# Patient Record
Sex: MALE | Race: WHITE | Employment: FULL TIME | URBAN - METROPOLITAN AREA
[De-identification: names, ages, dates, MRNs, and addresses within clinical notes are randomized per-mention and may not be internally consistent; named-entity substitution may affect disease eponyms.]

---

## 2017-08-17 ENCOUNTER — OFFICE VISIT (OUTPATIENT)
Dept: FAMILY MEDICINE CLINIC | Age: 52
End: 2017-08-17

## 2017-08-17 VITALS
WEIGHT: 190.8 LBS | DIASTOLIC BLOOD PRESSURE: 120 MMHG | OXYGEN SATURATION: 96 % | HEART RATE: 90 BPM | SYSTOLIC BLOOD PRESSURE: 180 MMHG | HEIGHT: 73 IN | BODY MASS INDEX: 25.29 KG/M2

## 2017-08-17 DIAGNOSIS — I10 ESSENTIAL HYPERTENSION: Primary | ICD-10-CM

## 2017-08-17 DIAGNOSIS — Z11.4 SCREENING FOR HIV WITHOUT PRESENCE OF RISK FACTORS: ICD-10-CM

## 2017-08-17 DIAGNOSIS — Z12.5 PROSTATE CANCER SCREENING: ICD-10-CM

## 2017-08-17 PROCEDURE — 99213 OFFICE O/P EST LOW 20 MIN: CPT | Performed by: FAMILY MEDICINE

## 2017-08-17 RX ORDER — VALSARTAN AND HYDROCHLOROTHIAZIDE 160; 12.5 MG/1; MG/1
1 TABLET, FILM COATED ORAL DAILY
Qty: 30 TABLET | Refills: 3 | Status: SHIPPED | OUTPATIENT
Start: 2017-08-17 | End: 2017-10-09

## 2017-08-17 ASSESSMENT — PATIENT HEALTH QUESTIONNAIRE - PHQ9
1. LITTLE INTEREST OR PLEASURE IN DOING THINGS: 0
SUM OF ALL RESPONSES TO PHQ QUESTIONS 1-9: 0
SUM OF ALL RESPONSES TO PHQ9 QUESTIONS 1 & 2: 0
2. FEELING DOWN, DEPRESSED OR HOPELESS: 0

## 2017-08-25 DIAGNOSIS — Z11.4 SCREENING FOR HIV WITHOUT PRESENCE OF RISK FACTORS: ICD-10-CM

## 2017-08-25 DIAGNOSIS — Z12.5 PROSTATE CANCER SCREENING: ICD-10-CM

## 2017-08-25 DIAGNOSIS — I10 ESSENTIAL HYPERTENSION: ICD-10-CM

## 2017-08-25 LAB
A/G RATIO: 1.7 (ref 1.1–2.2)
ALBUMIN SERPL-MCNC: 4.6 G/DL (ref 3.4–5)
ALP BLD-CCNC: 74 U/L (ref 40–129)
ALT SERPL-CCNC: 21 U/L (ref 10–40)
ANION GAP SERPL CALCULATED.3IONS-SCNC: 16 MMOL/L (ref 3–16)
AST SERPL-CCNC: 14 U/L (ref 15–37)
BASOPHILS ABSOLUTE: 0 K/UL (ref 0–0.2)
BASOPHILS RELATIVE PERCENT: 0.5 %
BILIRUB SERPL-MCNC: <0.2 MG/DL (ref 0–1)
BUN BLDV-MCNC: 9 MG/DL (ref 7–20)
CALCIUM SERPL-MCNC: 9.8 MG/DL (ref 8.3–10.6)
CHLORIDE BLD-SCNC: 98 MMOL/L (ref 99–110)
CHOLESTEROL, TOTAL: 167 MG/DL (ref 0–199)
CO2: 26 MMOL/L (ref 21–32)
CREAT SERPL-MCNC: 1.1 MG/DL (ref 0.9–1.3)
EOSINOPHILS ABSOLUTE: 0.2 K/UL (ref 0–0.6)
EOSINOPHILS RELATIVE PERCENT: 2.7 %
GFR AFRICAN AMERICAN: >60
GFR NON-AFRICAN AMERICAN: >60
GLOBULIN: 2.7 G/DL
GLUCOSE BLD-MCNC: 83 MG/DL (ref 70–99)
HCT VFR BLD CALC: 53 % (ref 40.5–52.5)
HDLC SERPL-MCNC: 33 MG/DL (ref 40–60)
HEMOGLOBIN: 17.7 G/DL (ref 13.5–17.5)
LDL CHOLESTEROL CALCULATED: 99 MG/DL
LYMPHOCYTES ABSOLUTE: 1.7 K/UL (ref 1–5.1)
LYMPHOCYTES RELATIVE PERCENT: 22.6 %
MCH RBC QN AUTO: 33.7 PG (ref 26–34)
MCHC RBC AUTO-ENTMCNC: 33.3 G/DL (ref 31–36)
MCV RBC AUTO: 101 FL (ref 80–100)
MONOCYTES ABSOLUTE: 1.1 K/UL (ref 0–1.3)
MONOCYTES RELATIVE PERCENT: 14.9 %
NEUTROPHILS ABSOLUTE: 4.4 K/UL (ref 1.7–7.7)
NEUTROPHILS RELATIVE PERCENT: 59.3 %
PDW BLD-RTO: 13.9 % (ref 12.4–15.4)
PLATELET # BLD: 222 K/UL (ref 135–450)
PMV BLD AUTO: 10.3 FL (ref 5–10.5)
POTASSIUM SERPL-SCNC: 4 MMOL/L (ref 3.5–5.1)
PROSTATE SPECIFIC ANTIGEN: 1.33 NG/ML (ref 0–4)
RBC # BLD: 5.24 M/UL (ref 4.2–5.9)
SODIUM BLD-SCNC: 140 MMOL/L (ref 136–145)
TOTAL PROTEIN: 7.3 G/DL (ref 6.4–8.2)
TRIGL SERPL-MCNC: 174 MG/DL (ref 0–150)
TSH REFLEX: 1.68 UIU/ML (ref 0.27–4.2)
VLDLC SERPL CALC-MCNC: 35 MG/DL
WBC # BLD: 7.5 K/UL (ref 4–11)

## 2017-08-28 LAB — HIV-1 AND HIV-2 ANTIBODIES: NORMAL

## 2017-09-08 ENCOUNTER — OFFICE VISIT (OUTPATIENT)
Dept: FAMILY MEDICINE CLINIC | Age: 52
End: 2017-09-08

## 2017-09-08 VITALS
WEIGHT: 187.8 LBS | BODY MASS INDEX: 24.78 KG/M2 | DIASTOLIC BLOOD PRESSURE: 106 MMHG | SYSTOLIC BLOOD PRESSURE: 144 MMHG | HEART RATE: 90 BPM | OXYGEN SATURATION: 97 %

## 2017-09-08 DIAGNOSIS — I10 ESSENTIAL HYPERTENSION: Primary | ICD-10-CM

## 2017-09-08 DIAGNOSIS — L98.9 SKIN LESION: ICD-10-CM

## 2017-09-08 PROBLEM — E78.1 HIGH TRIGLYCERIDES: Status: ACTIVE | Noted: 2017-09-08

## 2017-09-08 PROBLEM — Z72.0 TOBACCO ABUSE: Status: ACTIVE | Noted: 2017-09-08

## 2017-09-08 PROBLEM — E78.6 LOW HDL (UNDER 40): Status: ACTIVE | Noted: 2017-09-08

## 2017-09-08 PROCEDURE — 99213 OFFICE O/P EST LOW 20 MIN: CPT | Performed by: FAMILY MEDICINE

## 2017-09-08 RX ORDER — AMLODIPINE BESYLATE 5 MG/1
5 TABLET ORAL DAILY
Qty: 30 TABLET | Refills: 3 | Status: SHIPPED | OUTPATIENT
Start: 2017-09-08 | End: 2018-01-05 | Stop reason: SDUPTHER

## 2017-10-09 ENCOUNTER — OFFICE VISIT (OUTPATIENT)
Dept: FAMILY MEDICINE CLINIC | Age: 52
End: 2017-10-09

## 2017-10-09 VITALS
SYSTOLIC BLOOD PRESSURE: 180 MMHG | DIASTOLIC BLOOD PRESSURE: 104 MMHG | OXYGEN SATURATION: 97 % | WEIGHT: 186.2 LBS | BODY MASS INDEX: 24.57 KG/M2 | HEART RATE: 123 BPM

## 2017-10-09 DIAGNOSIS — I10 ESSENTIAL HYPERTENSION: Primary | ICD-10-CM

## 2017-10-09 DIAGNOSIS — Z72.0 TOBACCO ABUSE: ICD-10-CM

## 2017-10-09 PROCEDURE — 99213 OFFICE O/P EST LOW 20 MIN: CPT | Performed by: FAMILY MEDICINE

## 2017-10-09 RX ORDER — VALSARTAN AND HYDROCHLOROTHIAZIDE 320; 12.5 MG/1; MG/1
1 TABLET, FILM COATED ORAL DAILY
Qty: 30 TABLET | Refills: 5 | Status: SHIPPED | OUTPATIENT
Start: 2017-10-09 | End: 2018-01-26 | Stop reason: SDUPTHER

## 2017-10-09 NOTE — PATIENT INSTRUCTIONS
serving is 1 slice of bread, 1 ounce of dry cereal, or ½ cup of cooked rice, pasta, or cooked cereal. Try to choose whole-grain products as much as possible. · Limit lean meat, poultry, and fish to 2 servings each day. A serving is 3 ounces, about the size of a deck of cards. · Eat 4 to 5 servings of nuts, seeds, and legumes (cooked dried beans, lentils, and split peas) each week. A serving is 1/3 cup of nuts, 2 tablespoons of seeds, or ½ cup of cooked beans or peas. · Limit fats and oils to 2 to 3 servings each day. A serving is 1 teaspoon of vegetable oil or 2 tablespoons of salad dressing. · Limit sweets and added sugars to 5 servings or less a week. A serving is 1 tablespoon jelly or jam, ½ cup sorbet, or 1 cup of lemonade. · Eat less than 2,300 milligrams (mg) of sodium a day. If you limit your sodium to 1,500 mg a day, you can lower your blood pressure even more. Tips for success  · Start small. Do not try to make dramatic changes to your diet all at once. You might feel that you are missing out on your favorite foods and then be more likely to not follow the plan. Make small changes, and stick with them. Once those changes become habit, add a few more changes. · Try some of the following:  ¨ Make it a goal to eat a fruit or vegetable at every meal and at snacks. This will make it easy to get the recommended amount of fruits and vegetables each day. ¨ Try yogurt topped with fruit and nuts for a snack or healthy dessert. ¨ Add lettuce, tomato, cucumber, and onion to sandwiches. ¨ Combine a ready-made pizza crust with low-fat mozzarella cheese and lots of vegetable toppings. Try using tomatoes, squash, spinach, broccoli, carrots, cauliflower, and onions. ¨ Have a variety of cut-up vegetables with a low-fat dip as an appetizer instead of chips and dip. ¨ Sprinkle sunflower seeds or chopped almonds over salads. Or try adding chopped walnuts or almonds to cooked vegetables.   ¨ Try some vegetarian meals using beans and peas. Add garbanzo or kidney beans to salads. Make burritos and tacos with mashed pyle beans or black beans. Where can you learn more? Go to https://chdaniloeb.Stypi. org and sign in to your [employer] account. Enter B918 in the ScanÃ¢â‚¬Â¢Jour box to learn more about \"DASH Diet: Care Instructions. \"     If you do not have an account, please click on the \"Sign Up Now\" link. Current as of: April 3, 2017  Content Version: 11.3  © 1777-8751 [employer]. Care instructions adapted under license by [facility] ([facility]). If you have questions about a medical condition or this instruction, always ask your healthcare professional. [address] any warranty or liability for your use of this information.

## 2017-11-10 ENCOUNTER — OFFICE VISIT (OUTPATIENT)
Dept: FAMILY MEDICINE CLINIC | Age: 52
End: 2017-11-10

## 2017-11-10 VITALS
SYSTOLIC BLOOD PRESSURE: 138 MMHG | BODY MASS INDEX: 25.23 KG/M2 | WEIGHT: 190.4 LBS | HEIGHT: 73 IN | HEART RATE: 104 BPM | DIASTOLIC BLOOD PRESSURE: 86 MMHG | OXYGEN SATURATION: 98 % | RESPIRATION RATE: 16 BRPM

## 2017-11-10 DIAGNOSIS — Z72.0 TOBACCO ABUSE: ICD-10-CM

## 2017-11-10 DIAGNOSIS — I10 ESSENTIAL HYPERTENSION: Primary | ICD-10-CM

## 2017-11-10 PROCEDURE — 99213 OFFICE O/P EST LOW 20 MIN: CPT | Performed by: FAMILY MEDICINE

## 2017-11-10 NOTE — PROGRESS NOTES
Subjective:   Ankru Castañeda is a 47 y.o. male with hypertension. Hypertension ROS: taking medications as instructed, no medication side effects noted, no TIA's, no chest pain at rest or on exertion, no SOB or dyspnea on exertion, no swelling of ankles or feet. Exercise:walking 3 miles per day at work  New concerns: no new issues. Home BP: not checking    Tobacco Abuse: states he is doing better and smoking approx 15 cigts per day with goal in near future of 10 cigts/day    Outpatient Prescriptions Marked as Taking for the 11/10/17 encounter (Office Visit) with Zac Lara MD   Medication Sig Dispense Refill    valsartan-hydrochlorothiazide (DIOVAN-HCT) 320-12.5 MG per tablet Take 1 tablet by mouth daily 30 tablet 5    amLODIPine (NORVASC) 5 MG tablet Take 1 tablet by mouth daily 30 tablet 3     No Known Allergies    Objective:   /86 (Site: Left Arm, Position: Sitting, Cuff Size: Medium Adult)   Pulse 104   Resp 16   Ht 6' 1\" (1.854 m)   Wt 190 lb 6.4 oz (86.4 kg)   SpO2 98%   BMI 25.12 kg/m²    BP: my cuff  144/90                         patient cuff: n/a  Appearance alert, well appearing, and in no distress. Neck: No JVD, or bruits  Lungs: Chest is clear; no wheezes or rales. Heart: S1 and S2 normal, no murmurs, clicks, gallops or rubs. Regular rate and rhythm. Ext[de-identified] No edema  Lab review: 8/2017. Assessment/Plan:    Indra Zhang was seen today for 1 month follow-up and hypertension.     Diagnoses and all orders for this visit:    Essential hypertension  Improved  Borderline/Reasonably well controlled  Continue current treatment   Home BP checks  Return 2 months   Exercise  Tobacco abuse  Smoking cessation discussed with the patient

## 2018-01-26 ENCOUNTER — OFFICE VISIT (OUTPATIENT)
Dept: FAMILY MEDICINE CLINIC | Age: 53
End: 2018-01-26

## 2018-01-26 VITALS
OXYGEN SATURATION: 95 % | SYSTOLIC BLOOD PRESSURE: 130 MMHG | HEART RATE: 106 BPM | WEIGHT: 186.8 LBS | BODY MASS INDEX: 24.65 KG/M2 | DIASTOLIC BLOOD PRESSURE: 86 MMHG

## 2018-01-26 DIAGNOSIS — I10 ESSENTIAL HYPERTENSION: ICD-10-CM

## 2018-01-26 PROCEDURE — 99213 OFFICE O/P EST LOW 20 MIN: CPT | Performed by: FAMILY MEDICINE

## 2018-01-26 RX ORDER — AMLODIPINE BESYLATE 10 MG/1
TABLET ORAL
Qty: 30 TABLET | Refills: 5 | Status: SHIPPED | OUTPATIENT
Start: 2018-01-26 | End: 2018-07-28 | Stop reason: SDUPTHER

## 2018-01-26 RX ORDER — VALSARTAN AND HYDROCHLOROTHIAZIDE 320; 12.5 MG/1; MG/1
1 TABLET, FILM COATED ORAL DAILY
Qty: 30 TABLET | Refills: 5 | Status: SHIPPED | OUTPATIENT
Start: 2018-01-26 | End: 2018-03-09

## 2018-01-26 NOTE — PATIENT INSTRUCTIONS
pressure numbers will appear on the screen. · Write your numbers in your log book, along with the date and time. Manual blood pressure monitors  · Place the earpieces of a stethoscope in your ears, and place the bell of the stethoscope over the artery, just below the cuff. · Close the valve on the rubber inflating bulb. · Squeeze the bulb rapidly with your opposite hand to inflate the cuff until the dial or column of mercury reads about 30 mm Hg higher than your usual systolic pressure. If you do not know your usual pressure, inflate the cuff to 210 mm Hg or until the pulse at your wrist disappears. · Open the pressure valve just slightly by twisting or pressing the valve on the bulb. · As you watch the pressure slowly fall, note the level on the dial at which you first start to hear a pulsing or tapping sound through the stethoscope. This is your systolic blood pressure. · Continue letting the air out slowly. The sounds will become muffled and will finally disappear. Note the pressure when the sounds completely disappear. This is your diastolic blood pressure. Let out all the remaining air. · Write your numbers in your log book, along with the date and time. What else should you know about the test?  Results for adults ages 25 and older (mm Hg):  · Normal (ideal): Systolic 978 or below. Diastolic 79 or below. · Prehypertension: Systolic 963 to 205. Diastolic 80 to 89. · Hypertension: Systolic 373 or above. Diastolic 90 or above. Follow-up care is a key part of your treatment and safety. Be sure to make and go to all appointments, and call your doctor if you are having problems. It's also a good idea to keep a list of the medicines you take. Where can you learn more? Go to https://lashanda.BookingBug. org and sign in to your [employer] account.  Enter C427 in the StyleTrek box to learn more about \"Home Blood Pressure Test: About This Test.\"     If you do not have an account, please

## 2018-03-09 ENCOUNTER — OFFICE VISIT (OUTPATIENT)
Dept: FAMILY MEDICINE CLINIC | Age: 53
End: 2018-03-09

## 2018-03-09 VITALS
WEIGHT: 187.8 LBS | HEART RATE: 95 BPM | DIASTOLIC BLOOD PRESSURE: 84 MMHG | SYSTOLIC BLOOD PRESSURE: 132 MMHG | BODY MASS INDEX: 24.78 KG/M2 | OXYGEN SATURATION: 96 %

## 2018-03-09 DIAGNOSIS — N52.9 ERECTILE DYSFUNCTION, UNSPECIFIED ERECTILE DYSFUNCTION TYPE: ICD-10-CM

## 2018-03-09 DIAGNOSIS — I10 ESSENTIAL HYPERTENSION: Primary | ICD-10-CM

## 2018-03-09 PROCEDURE — 99213 OFFICE O/P EST LOW 20 MIN: CPT | Performed by: FAMILY MEDICINE

## 2018-03-09 RX ORDER — VALSARTAN AND HYDROCHLOROTHIAZIDE 320; 25 MG/1; MG/1
1 TABLET, FILM COATED ORAL DAILY
Qty: 30 TABLET | Refills: 5 | Status: SHIPPED | OUTPATIENT
Start: 2018-03-09 | End: 2018-07-30

## 2018-03-09 RX ORDER — SILDENAFIL 100 MG/1
100 TABLET, FILM COATED ORAL PRN
Qty: 2 TABLET | Refills: 0 | COMMUNITY
Start: 2018-03-09 | End: 2020-06-29

## 2018-03-09 NOTE — PROGRESS NOTES
Subjective:   Torrey Koenig is a 47 y.o. male with hypertension. Hypertension ROS: taking medications as instructed, no medication side effects noted, no TIA's, no chest pain at rest or on exertion, no SOB or dyspnea on exertion, no swelling of ankles or feet. Exercise:working and push ups  New concerns: working a lot of hours. Hasn't had intercourse in 10 years and a few weeks ago had opportunity to have intercourse and could not get an erection  Home BP: checks at [facility] and last was 140/97    Outpatient Prescriptions Marked as Taking for the 3/9/18 encounter (Office Visit) with Liliam De Souza MD   Medication Sig Dispense Refill    valsartan-hydrochlorothiazide (DIOVAN-HCT) 320-12.5 MG per tablet Take 1 tablet by mouth daily 30 tablet 5    amLODIPine (NORVASC) 10 MG tablet TAKE ONE TABLET BY MOUTH DAILY 30 tablet 5     No Known Allergies    Objective:   /84 (Site: Left Arm, Position: Sitting, Cuff Size: Large Adult)   Pulse 95   Wt 187 lb 12.8 oz (85.2 kg)   SpO2 96%   BMI 24.78 kg/m²    BP: my cuff  136/82                         patient cuff: n/a  Appearance alert, well appearing, and in no distress. Neck: No JVD, or bruits  Lungs: Chest is clear; no wheezes or rales. Heart: S1 and S2 normal, no murmurs, clicks, gallops or rubs. Regular rate and rhythm. Ext[de-identified] No edema  Lab review: 8/2017. Assessment/Plan:    Anastasia Nye was seen today for hypertension. Diagnoses and all orders for this visit:    Essential hypertension  Not bad BP here but states during workday, BP has been increased at [facility]  -     Increase valsartan-hydrochlorothiazide (DIOVAN-HCT) 320-25 MG per tablet; Take 1 tablet by mouth daily  Return 1 month after on above med  Erectile dysfunction, unspecified erectile dysfunction type  -     Sample of sildenafil (VIAGRA) 100 MG tablet;  Take 1 tablet by mouth as needed for Erectile Dysfunction

## 2018-06-05 ENCOUNTER — OFFICE VISIT (OUTPATIENT)
Dept: FAMILY MEDICINE CLINIC | Age: 53
End: 2018-06-05

## 2018-06-05 VITALS
SYSTOLIC BLOOD PRESSURE: 132 MMHG | HEART RATE: 104 BPM | OXYGEN SATURATION: 98 % | DIASTOLIC BLOOD PRESSURE: 76 MMHG | WEIGHT: 181.2 LBS | HEIGHT: 73 IN | BODY MASS INDEX: 24.01 KG/M2

## 2018-06-05 DIAGNOSIS — E78.6 LOW HDL (UNDER 40): ICD-10-CM

## 2018-06-05 DIAGNOSIS — I10 ESSENTIAL HYPERTENSION: Primary | ICD-10-CM

## 2018-06-05 DIAGNOSIS — E78.1 HIGH TRIGLYCERIDES: ICD-10-CM

## 2018-06-05 DIAGNOSIS — Z12.5 PROSTATE CANCER SCREENING: ICD-10-CM

## 2018-06-05 DIAGNOSIS — Z72.0 TOBACCO ABUSE: ICD-10-CM

## 2018-06-05 PROCEDURE — 99213 OFFICE O/P EST LOW 20 MIN: CPT | Performed by: FAMILY MEDICINE

## 2018-07-28 DIAGNOSIS — I10 ESSENTIAL HYPERTENSION: ICD-10-CM

## 2018-07-30 RX ORDER — AMLODIPINE BESYLATE 10 MG/1
TABLET ORAL
Qty: 90 TABLET | Refills: 3 | Status: SHIPPED | OUTPATIENT
Start: 2018-07-30 | End: 2019-07-24 | Stop reason: SDUPTHER

## 2018-07-30 RX ORDER — VALSARTAN AND HYDROCHLOROTHIAZIDE 320; 12.5 MG/1; MG/1
TABLET, FILM COATED ORAL
Qty: 90 TABLET | Refills: 3 | Status: SHIPPED | OUTPATIENT
Start: 2018-07-30 | End: 2019-07-25 | Stop reason: SDUPTHER

## 2018-07-30 NOTE — TELEPHONE ENCOUNTER
Last OV: 06/05/2018  Last refill: 03/09/2018, #30, 5 refills. Last refill: 01/26/2018, #30, 5 refills.        Lab Results   Component Value Date     08/25/2017    K 4.0 08/25/2017    CL 98 (L) 08/25/2017    CO2 26 08/25/2017    BUN 9 08/25/2017    CREATININE 1.1 08/25/2017    GLUCOSE 83 08/25/2017    CALCIUM 9.8 08/25/2017    PROT 7.3 08/25/2017    LABALBU 4.6 08/25/2017    BILITOT <0.2 08/25/2017    ALKPHOS 74 08/25/2017    AST 14 (L) 08/25/2017    ALT 21 08/25/2017    LABGLOM >60 08/25/2017    GFRAA >60 08/25/2017    AGRATIO 1.7 08/25/2017    GLOB 2.7 08/25/2017

## 2018-08-01 DIAGNOSIS — Z12.5 PROSTATE CANCER SCREENING: ICD-10-CM

## 2018-08-01 DIAGNOSIS — E78.6 LOW HDL (UNDER 40): ICD-10-CM

## 2018-08-01 DIAGNOSIS — I10 ESSENTIAL HYPERTENSION: ICD-10-CM

## 2018-08-01 DIAGNOSIS — E78.1 HIGH TRIGLYCERIDES: ICD-10-CM

## 2018-08-01 LAB
A/G RATIO: 1.7 (ref 1.1–2.2)
ALBUMIN SERPL-MCNC: 4.2 G/DL (ref 3.4–5)
ALP BLD-CCNC: 75 U/L (ref 40–129)
ALT SERPL-CCNC: 19 U/L (ref 10–40)
ANION GAP SERPL CALCULATED.3IONS-SCNC: 13 MMOL/L (ref 3–16)
AST SERPL-CCNC: 23 U/L (ref 15–37)
BASOPHILS ABSOLUTE: 0 K/UL (ref 0–0.2)
BASOPHILS RELATIVE PERCENT: 0.6 %
BILIRUB SERPL-MCNC: 0.9 MG/DL (ref 0–1)
BUN BLDV-MCNC: 12 MG/DL (ref 7–20)
CALCIUM SERPL-MCNC: 9.2 MG/DL (ref 8.3–10.6)
CHLORIDE BLD-SCNC: 101 MMOL/L (ref 99–110)
CHOLESTEROL, TOTAL: 198 MG/DL (ref 0–199)
CO2: 29 MMOL/L (ref 21–32)
CREAT SERPL-MCNC: 0.8 MG/DL (ref 0.9–1.3)
EOSINOPHILS ABSOLUTE: 0.3 K/UL (ref 0–0.6)
EOSINOPHILS RELATIVE PERCENT: 4.9 %
GFR AFRICAN AMERICAN: >60
GFR NON-AFRICAN AMERICAN: >60
GLOBULIN: 2.5 G/DL
GLUCOSE BLD-MCNC: 101 MG/DL (ref 70–99)
HCT VFR BLD CALC: 44.6 % (ref 40.5–52.5)
HDLC SERPL-MCNC: 59 MG/DL (ref 40–60)
HEMOGLOBIN: 15.4 G/DL (ref 13.5–17.5)
LDL CHOLESTEROL CALCULATED: 115 MG/DL
LYMPHOCYTES ABSOLUTE: 1.3 K/UL (ref 1–5.1)
LYMPHOCYTES RELATIVE PERCENT: 22 %
MCH RBC QN AUTO: 33.4 PG (ref 26–34)
MCHC RBC AUTO-ENTMCNC: 34.4 G/DL (ref 31–36)
MCV RBC AUTO: 97.1 FL (ref 80–100)
MONOCYTES ABSOLUTE: 0.9 K/UL (ref 0–1.3)
MONOCYTES RELATIVE PERCENT: 15.5 %
NEUTROPHILS ABSOLUTE: 3.3 K/UL (ref 1.7–7.7)
NEUTROPHILS RELATIVE PERCENT: 57 %
PDW BLD-RTO: 14 % (ref 12.4–15.4)
PLATELET # BLD: 292 K/UL (ref 135–450)
PMV BLD AUTO: 9 FL (ref 5–10.5)
POTASSIUM SERPL-SCNC: 2.9 MMOL/L (ref 3.5–5.1)
PROSTATE SPECIFIC ANTIGEN: 3.75 NG/ML (ref 0–4)
RBC # BLD: 4.6 M/UL (ref 4.2–5.9)
SODIUM BLD-SCNC: 143 MMOL/L (ref 136–145)
TOTAL PROTEIN: 6.7 G/DL (ref 6.4–8.2)
TRIGL SERPL-MCNC: 120 MG/DL (ref 0–150)
TSH REFLEX: 0.97 UIU/ML (ref 0.27–4.2)
VLDLC SERPL CALC-MCNC: 24 MG/DL
WBC # BLD: 5.8 K/UL (ref 4–11)

## 2018-08-02 DIAGNOSIS — E87.6 HYPOKALEMIA: Primary | ICD-10-CM

## 2018-08-02 RX ORDER — POTASSIUM CHLORIDE 750 MG/1
10 TABLET, FILM COATED, EXTENDED RELEASE ORAL DAILY
Qty: 30 TABLET | Refills: 3 | Status: SHIPPED | OUTPATIENT
Start: 2018-08-02 | End: 2018-08-03 | Stop reason: SDUPTHER

## 2018-08-03 DIAGNOSIS — E87.6 HYPOKALEMIA: ICD-10-CM

## 2018-08-03 RX ORDER — POTASSIUM CHLORIDE 750 MG/1
10 TABLET, FILM COATED, EXTENDED RELEASE ORAL DAILY
Qty: 90 TABLET | Refills: 1 | Status: SHIPPED | OUTPATIENT
Start: 2018-08-03 | End: 2019-01-27 | Stop reason: SDUPTHER

## 2018-08-08 ENCOUNTER — TELEPHONE (OUTPATIENT)
Dept: FAMILY MEDICINE CLINIC | Age: 53
End: 2018-08-08

## 2018-08-08 DIAGNOSIS — R97.20 INCREASED PROSTATE SPECIFIC ANTIGEN (PSA) VELOCITY: Primary | ICD-10-CM

## 2019-03-11 ENCOUNTER — OFFICE VISIT (OUTPATIENT)
Dept: FAMILY MEDICINE CLINIC | Age: 54
End: 2019-03-11
Payer: COMMERCIAL

## 2019-03-11 VITALS
HEIGHT: 72 IN | HEART RATE: 114 BPM | SYSTOLIC BLOOD PRESSURE: 144 MMHG | BODY MASS INDEX: 25.3 KG/M2 | OXYGEN SATURATION: 99 % | WEIGHT: 186.8 LBS | DIASTOLIC BLOOD PRESSURE: 96 MMHG

## 2019-03-11 DIAGNOSIS — Z72.0 TOBACCO ABUSE: ICD-10-CM

## 2019-03-11 DIAGNOSIS — I10 ESSENTIAL HYPERTENSION: Primary | ICD-10-CM

## 2019-03-11 PROCEDURE — 99213 OFFICE O/P EST LOW 20 MIN: CPT | Performed by: FAMILY MEDICINE

## 2019-03-11 RX ORDER — TAMSULOSIN HYDROCHLORIDE 0.4 MG/1
0.4 CAPSULE ORAL DAILY
COMMUNITY
End: 2020-03-30 | Stop reason: SDUPTHER

## 2019-03-11 ASSESSMENT — PATIENT HEALTH QUESTIONNAIRE - PHQ9
2. FEELING DOWN, DEPRESSED OR HOPELESS: 0
SUM OF ALL RESPONSES TO PHQ9 QUESTIONS 1 & 2: 0
SUM OF ALL RESPONSES TO PHQ QUESTIONS 1-9: 0
SUM OF ALL RESPONSES TO PHQ QUESTIONS 1-9: 0
1. LITTLE INTEREST OR PLEASURE IN DOING THINGS: 0

## 2019-05-02 ENCOUNTER — OFFICE VISIT (OUTPATIENT)
Dept: FAMILY MEDICINE CLINIC | Age: 54
End: 2019-05-02
Payer: COMMERCIAL

## 2019-05-02 VITALS
SYSTOLIC BLOOD PRESSURE: 142 MMHG | WEIGHT: 189.4 LBS | HEART RATE: 107 BPM | BODY MASS INDEX: 25.69 KG/M2 | DIASTOLIC BLOOD PRESSURE: 84 MMHG | OXYGEN SATURATION: 96 %

## 2019-05-02 DIAGNOSIS — I10 ESSENTIAL HYPERTENSION: Primary | ICD-10-CM

## 2019-05-02 DIAGNOSIS — Z72.0 TOBACCO ABUSE: ICD-10-CM

## 2019-05-02 DIAGNOSIS — R73.01 IMPAIRED FASTING BLOOD SUGAR: ICD-10-CM

## 2019-05-02 DIAGNOSIS — E78.1 HIGH TRIGLYCERIDES: ICD-10-CM

## 2019-05-02 DIAGNOSIS — Z12.5 PROSTATE CANCER SCREENING: ICD-10-CM

## 2019-05-02 PROCEDURE — 99213 OFFICE O/P EST LOW 20 MIN: CPT | Performed by: FAMILY MEDICINE

## 2019-05-02 NOTE — PROGRESS NOTES
Subjective:   Deedee Santos is a 47 y.o. male with hypertension. Hypertension ROS: taking medications as instructed, no medication side effects noted, no TIA's, no chest pain at rest or on exertion, no SOB or dyspnea on exertion, no swelling of ankles or feet. 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 Father  2 weeks ago and he was down in [place] with him. States he has missed some of his medication     Tobacco abuse: continues to smoke and probably more in the last few weeks      Outpatient Medications Marked as Taking for the 19 encounter (Office Visit) with Israel Bautista MD   Medication Sig Dispense Refill    tamsulosin (FLOMAX) 0.4 MG capsule Take 0.4 mg by mouth daily      potassium chloride (KLOR-CON) 10 MEQ extended release tablet TAKE ONE TABLET BY MOUTH DAILY 90 tablet 2    valsartan-hydrochlorothiazide (DIOVAN-HCT) 320-12.5 MG per tablet TAKE ONE TABLET BY MOUTH DAILY 90 tablet 3    amLODIPine (NORVASC) 10 MG tablet TAKE ONE TABLET BY MOUTH DAILY 90 tablet 3    sildenafil (VIAGRA) 100 MG tablet Take 1 tablet by mouth as needed for Erectile Dysfunction 2 tablet 0     No Known Allergies    Objective:   BP (!) 142/84 (Site: Left Upper Arm, Position: Sitting, Cuff Size: Large Adult)   Pulse 107   Wt 189 lb 6.4 oz (85.9 kg)   SpO2 96%   BMI 25.69 kg/m²    BP: my cuff  130/88                         patient cuff: n/a  Appearance alert, well appearing, and in no distress. Neck: No JVD, or bruits  Lungs: Chest is clear; no wheezes or rales. Heart: S1 and S2 normal, no murmurs, clicks, gallops or rubs. Regular rate and rhythm. Ext[de-identified] No edema  Lab review: orders written for new lab studies as appropriate; see orders. Assessment/Plan:    Savanah Gutierres was seen today for blood pressure check.     Diagnoses and all orders for this visit:    Essential hypertension  -     CBC Auto Differential; Future  -     Comprehensive Metabolic Panel; Future  -     TSH with Reflex; Future  Reasonably well controlled  Continue current treatment   Advised regarding medication compliance    Tobacco abuse  Smoking cessation discussed with the patient again    High triglycerides  -     Lipid Panel; Future    Prostate cancer screening  -     Psa screening;  Future    Impaired fasting blood sugar  -     Hemoglobin A1C; Future

## 2019-05-02 NOTE — PATIENT INSTRUCTIONS
Patient Education        DASH Diet: Care Instructions  Your Care Instructions    The DASH diet is an eating plan that can help lower your blood pressure. DASH stands for Dietary Approaches to Stop Hypertension. Hypertension is high blood pressure. The DASH diet focuses on eating foods that are high in calcium, potassium, and magnesium. These nutrients can lower blood pressure. The foods that are highest in these nutrients are fruits, vegetables, low-fat dairy products, nuts, seeds, and legumes. But taking calcium, potassium, and magnesium supplements instead of eating foods that are high in those nutrients does not have the same effect. The DASH diet also includes whole grains, fish, and poultry. The DASH diet is one of several lifestyle changes your doctor may recommend to lower your high blood pressure. Your doctor may also want you to decrease the amount of sodium in your diet. Lowering sodium while following the DASH diet can lower blood pressure even further than just the DASH diet alone. Follow-up care is a key part of your treatment and safety. Be sure to make and go to all appointments, and call your doctor if you are having problems. It's also a good idea to know your test results and keep a list of the medicines you take. How can you care for yourself at home? Following the DASH diet  · Eat 4 to 5 servings of fruit each day. A serving is 1 medium-sized piece of fruit, ½ cup chopped or canned fruit, 1/4 cup dried fruit, or 4 ounces (½ cup) of fruit juice. Choose fruit more often than fruit juice. · Eat 4 to 5 servings of vegetables each day. A serving is 1 cup of lettuce or raw leafy vegetables, ½ cup of chopped or cooked vegetables, or 4 ounces (½ cup) of vegetable juice. Choose vegetables more often than vegetable juice. · Get 2 to 3 servings of low-fat and fat-free dairy each day. A serving is 8 ounces of milk, 1 cup of yogurt, or 1 ½ ounces of cheese. · Eat 6 to 8 servings of grains each day. A serving is 1 slice of bread, 1 ounce of dry cereal, or ½ cup of cooked rice, pasta, or cooked cereal. Try to choose whole-grain products as much as possible. · Limit lean meat, poultry, and fish to 2 servings each day. A serving is 3 ounces, about the size of a deck of cards. · Eat 4 to 5 servings of nuts, seeds, and legumes (cooked dried beans, lentils, and split peas) each week. A serving is 1/3 cup of nuts, 2 tablespoons of seeds, or ½ cup of cooked beans or peas. · Limit fats and oils to 2 to 3 servings each day. A serving is 1 teaspoon of vegetable oil or 2 tablespoons of salad dressing. · Limit sweets and added sugars to 5 servings or less a week. A serving is 1 tablespoon jelly or jam, ½ cup sorbet, or 1 cup of lemonade. · Eat less than 2,300 milligrams (mg) of sodium a day. If you limit your sodium to 1,500 mg a day, you can lower your blood pressure even more. Tips for success  · Start small. Do not try to make dramatic changes to your diet all at once. You might feel that you are missing out on your favorite foods and then be more likely to not follow the plan. Make small changes, and stick with them. Once those changes become habit, add a few more changes. · Try some of the following:  ? Make it a goal to eat a fruit or vegetable at every meal and at snacks. This will make it easy to get the recommended amount of fruits and vegetables each day. ? Try yogurt topped with fruit and nuts for a snack or healthy dessert. ? Add lettuce, tomato, cucumber, and onion to sandwiches. ? Combine a ready-made pizza crust with low-fat mozzarella cheese and lots of vegetable toppings. Try using tomatoes, squash, spinach, broccoli, carrots, cauliflower, and onions. ? Have a variety of cut-up vegetables with a low-fat dip as an appetizer instead of chips and dip. ? Sprinkle sunflower seeds or chopped almonds over salads. Or try adding chopped walnuts or almonds to cooked vegetables.   ? Try some vegetarian meals using beans and peas. Add garbanzo or kidney beans to salads. Make burritos and tacos with mashed pyle beans or black beans. Where can you learn more? Go to https://chdaniloeb.SpePharm. org and sign in to your [employer] account. Enter S204 in the Dashi Intelligence box to learn more about \"DASH Diet: Care Instructions. \"     If you do not have an account, please click on the \"Sign Up Now\" link. Current as of: July 22, 2018  Content Version: 11.9  © 5349-0855 [employer]. Care instructions adapted under license by [facility] ([facility]). If you have questions about a medical condition or this instruction, always ask your healthcare professional. [address] any warranty or liability for your use of this information.

## 2019-10-22 DIAGNOSIS — E87.6 HYPOKALEMIA: ICD-10-CM

## 2019-10-22 RX ORDER — POTASSIUM CHLORIDE 750 MG/1
TABLET, FILM COATED, EXTENDED RELEASE ORAL
Qty: 90 TABLET | Refills: 1 | Status: SHIPPED | OUTPATIENT
Start: 2019-10-22 | End: 2020-04-20

## 2020-03-28 ENCOUNTER — PATIENT MESSAGE (OUTPATIENT)
Dept: FAMILY MEDICINE CLINIC | Age: 55
End: 2020-03-28

## 2020-03-30 RX ORDER — TAMSULOSIN HYDROCHLORIDE 0.4 MG/1
0.4 CAPSULE ORAL DAILY
Qty: 90 CAPSULE | Refills: 0 | Status: SHIPPED | OUTPATIENT
Start: 2020-03-30 | End: 2020-04-09 | Stop reason: SDUPTHER

## 2020-04-01 RX ORDER — TAMSULOSIN HYDROCHLORIDE 0.4 MG/1
0.4 CAPSULE ORAL DAILY
Qty: 90 CAPSULE | Refills: 0 | OUTPATIENT
Start: 2020-04-01

## 2020-04-09 ENCOUNTER — TELEPHONE (OUTPATIENT)
Dept: FAMILY MEDICINE CLINIC | Age: 55
End: 2020-04-09

## 2020-04-09 ENCOUNTER — PATIENT MESSAGE (OUTPATIENT)
Dept: FAMILY MEDICINE CLINIC | Age: 55
End: 2020-04-09

## 2020-04-09 RX ORDER — TAMSULOSIN HYDROCHLORIDE 0.4 MG/1
0.4 CAPSULE ORAL DAILY
Qty: 90 CAPSULE | Refills: 0 | Status: SHIPPED | OUTPATIENT
Start: 2020-04-09 | End: 2020-04-10 | Stop reason: SDUPTHER

## 2020-04-10 RX ORDER — TAMSULOSIN HYDROCHLORIDE 0.4 MG/1
0.4 CAPSULE ORAL DAILY
Qty: 90 CAPSULE | Refills: 0 | Status: SHIPPED | OUTPATIENT
Start: 2020-04-10 | End: 2020-07-06

## 2020-04-20 RX ORDER — POTASSIUM CHLORIDE 750 MG/1
TABLET, FILM COATED, EXTENDED RELEASE ORAL
Qty: 90 TABLET | Refills: 0 | Status: SHIPPED | OUTPATIENT
Start: 2020-04-20 | End: 2020-07-20

## 2020-04-20 NOTE — TELEPHONE ENCOUNTER
Medication:   Requested Prescriptions     Pending Prescriptions Disp Refills    potassium chloride (KLOR-CON) 10 MEQ extended release tablet [Pharmacy Med Name: POTASSIUM CHLORIDE ER 10MEQ TABLET] 90 tablet 0     Sig: TAKE ONE TABLET BY MOUTH DAILY       Last Filled:  10/22/19 #90, 1 RF     Patient Phone Number: [phone] (home) [phone] (work)    Last appt: 5/2/19 HTN   Next appt: Visit date not found    Lab Results   Component Value Date     08/01/2018    K 2.9 (LL) 08/01/2018     08/01/2018    CO2 29 08/01/2018    BUN 12 08/01/2018    CREATININE 0.8 (L) 08/01/2018    GLUCOSE 101 (H) 08/01/2018    CALCIUM 9.2 08/01/2018    PROT 6.7 08/01/2018    LABALBU 4.2 08/01/2018    BILITOT 0.9 08/01/2018    ALKPHOS 75 08/01/2018    AST 23 08/01/2018    ALT 19 08/01/2018    LABGLOM >60 08/01/2018    GFRAA >60 08/01/2018    AGRATIO 1.7 08/01/2018    GLOB 2.5 08/01/2018

## 2020-04-29 RX ORDER — VARENICLINE TARTRATE 1 MG/1
1 TABLET, FILM COATED ORAL 2 TIMES DAILY
Qty: 60 TABLET | Refills: 0 | Status: SHIPPED | OUTPATIENT
Start: 2020-04-29 | End: 2020-06-25 | Stop reason: SDUPTHER

## 2020-04-29 NOTE — TELEPHONE ENCOUNTER
Medication:   Requested Prescriptions     Pending Prescriptions Disp Refills    varenicline (CHANTIX CONTINUING MONTH MTAT) 1 MG tablet 60 tablet 3     Sig: Take 1 tablet by mouth 2 times daily        Last Filled:  12/30/19 #60, 3 RF     Patient Phone Number: [phone] (home) [phone] (work)    Last appt: 5/2/2019 HTN   Next appt: Visit date not found

## 2020-06-25 ENCOUNTER — TELEPHONE (OUTPATIENT)
Dept: FAMILY MEDICINE CLINIC | Age: 55
End: 2020-06-25

## 2020-06-25 RX ORDER — VARENICLINE TARTRATE 1 MG/1
1 TABLET, FILM COATED ORAL 2 TIMES DAILY
Qty: 60 TABLET | Refills: 0 | Status: SHIPPED | OUTPATIENT
Start: 2020-06-25 | End: 2020-10-23 | Stop reason: SDUPTHER

## 2020-06-25 NOTE — TELEPHONE ENCOUNTER
Medication:   Requested Prescriptions     Pending Prescriptions Disp Refills    varenicline (CHANTIX CONTINUING MONTH MATT) 1 MG tablet 60 tablet 0     Sig: Take 1 tablet by mouth 2 times daily        Last Filled:  4/29/20 #60, 0 RF     Patient Phone Number: [phone] (home) [phone] (work)    Last appt: 5/2/2019 HTN   Next appt: Visit date not found - left message on machine, please schedule office visit when patient returns call

## 2020-06-29 ENCOUNTER — VIRTUAL VISIT (OUTPATIENT)
Dept: FAMILY MEDICINE CLINIC | Age: 55
End: 2020-06-29
Payer: COMMERCIAL

## 2020-06-29 PROCEDURE — 99214 OFFICE O/P EST MOD 30 MIN: CPT | Performed by: FAMILY MEDICINE

## 2020-06-29 ASSESSMENT — PATIENT HEALTH QUESTIONNAIRE - PHQ9
1. LITTLE INTEREST OR PLEASURE IN DOING THINGS: 0
SUM OF ALL RESPONSES TO PHQ QUESTIONS 1-9: 0
SUM OF ALL RESPONSES TO PHQ9 QUESTIONS 1 & 2: 0
2. FEELING DOWN, DEPRESSED OR HOPELESS: 0
SUM OF ALL RESPONSES TO PHQ QUESTIONS 1-9: 0

## 2020-06-29 ASSESSMENT — ENCOUNTER SYMPTOMS
GASTROINTESTINAL NEGATIVE: 1
RESPIRATORY NEGATIVE: 1

## 2020-07-06 RX ORDER — TAMSULOSIN HYDROCHLORIDE 0.4 MG/1
CAPSULE ORAL
Qty: 90 CAPSULE | Refills: 3 | Status: SHIPPED | OUTPATIENT
Start: 2020-07-06 | End: 2021-07-15

## 2020-07-06 NOTE — TELEPHONE ENCOUNTER
Medication:   Requested Prescriptions     Pending Prescriptions Disp Refills    tamsulosin (FLOMAX) 0.4 MG capsule [Pharmacy Med Name: TAMSULOSIN HCL 0.4 MG CAPSULE] 90 capsule 0     Sig: TAKE ONE CAPSULE BY MOUTH DAILY        Last Filled:  4/10/20 #90, 0 RF     Patient Phone Number: [phone] (home) [phone] (work)    Last appt: 6/29/20 HTN - VV   Next appt: Visit date not found    Last OARRS: No flowsheet data found.

## 2020-10-19 RX ORDER — VALSARTAN AND HYDROCHLOROTHIAZIDE 320; 12.5 MG/1; MG/1
TABLET, FILM COATED ORAL
Qty: 90 TABLET | Refills: 1 | Status: SHIPPED | OUTPATIENT
Start: 2020-10-19 | End: 2021-04-19

## 2020-10-19 RX ORDER — POTASSIUM CHLORIDE 750 MG/1
TABLET, FILM COATED, EXTENDED RELEASE ORAL
Qty: 90 TABLET | Refills: 1 | Status: SHIPPED | OUTPATIENT
Start: 2020-10-19 | End: 2021-04-19

## 2020-10-19 NOTE — TELEPHONE ENCOUNTER
Medication:   Requested Prescriptions     Pending Prescriptions Disp Refills    potassium chloride (KLOR-CON) 10 MEQ extended release tablet [Pharmacy Med Name: POTASSIUM CHLORIDE ER 10MEQ TABLET] 90 tablet 0     Sig: TAKE ONE TABLET BY MOUTH DAILY    valsartan-hydrochlorothiazide (DIOVAN-HCT) 320-12.5 MG per tablet [Pharmacy Med Name: Phani Caba 320-12.5 MG TAB] 90 tablet 0     Sig: TAKE ONE TABLET BY MOUTH DAILY       Last Filled:  7/20/20 #90, 0 RF     Patient Phone Number: [phone] (home) [phone] (work)    Last appt: 6/29/20 HTN - VV   Next appt: Visit date not found    Lab Results   Component Value Date     08/01/2018    K 2.9 (LL) 08/01/2018     08/01/2018    CO2 29 08/01/2018    BUN 12 08/01/2018    CREATININE 0.8 (L) 08/01/2018    GLUCOSE 101 (H) 08/01/2018    CALCIUM 9.2 08/01/2018    PROT 6.7 08/01/2018    LABALBU 4.2 08/01/2018    BILITOT 0.9 08/01/2018    ALKPHOS 75 08/01/2018    AST 23 08/01/2018    ALT 19 08/01/2018    LABGLOM >60 08/01/2018    GFRAA >60 08/01/2018    AGRATIO 1.7 08/01/2018    GLOB 2.5 08/01/2018

## 2020-12-07 ENCOUNTER — NURSE TRIAGE (OUTPATIENT)
Dept: OTHER | Facility: CLINIC | Age: 55
End: 2020-12-07

## 2020-12-07 ENCOUNTER — OFFICE VISIT (OUTPATIENT)
Dept: FAMILY MEDICINE CLINIC | Age: 55
End: 2020-12-07
Payer: COMMERCIAL

## 2020-12-07 VITALS
WEIGHT: 183.8 LBS | OXYGEN SATURATION: 96 % | BODY MASS INDEX: 24.93 KG/M2 | SYSTOLIC BLOOD PRESSURE: 132 MMHG | DIASTOLIC BLOOD PRESSURE: 84 MMHG | HEART RATE: 125 BPM

## 2020-12-07 PROCEDURE — 99214 OFFICE O/P EST MOD 30 MIN: CPT | Performed by: FAMILY MEDICINE

## 2020-12-07 ASSESSMENT — ENCOUNTER SYMPTOMS
GASTROINTESTINAL NEGATIVE: 1
RESPIRATORY NEGATIVE: 1

## 2020-12-07 ASSESSMENT — PATIENT HEALTH QUESTIONNAIRE - PHQ9
SUM OF ALL RESPONSES TO PHQ QUESTIONS 1-9: 0
SUM OF ALL RESPONSES TO PHQ QUESTIONS 1-9: 0
2. FEELING DOWN, DEPRESSED OR HOPELESS: 0
SUM OF ALL RESPONSES TO PHQ QUESTIONS 1-9: 0
SUM OF ALL RESPONSES TO PHQ9 QUESTIONS 1 & 2: 0
1. LITTLE INTEREST OR PLEASURE IN DOING THINGS: 0

## 2020-12-07 NOTE — PROGRESS NOTES
Subjective:      Patient ID: Virginia Ferrell is a 48 y.o. male. HPI  Patient presents with his girlfriend. Both she and he states that he had some unusual and bizarre symptoms starting approximately 1 PM yesterday. They lasted until she called the life squad approximately 8:40 PM.  He was not taken to the hospital last night. They both feel that these may be due to social deprivation due to Covid as he was quarantine for 14 days after being exposed to her who had tested positive with Covid. She states his behavior consisted of making sexual innuendos to her mother, some confusion, inviting someone to come over and play cards and then appearing in a thong, and other similar bizarre behavior. He denies any suicidal, self injury, or homicidal thoughts. He states he loves his job and thinks his life is fantastic. He does not have any specific reason as to what happened. He states he was somewhat unaware of this. Currently he states he is feeling in his usual state of health. The only recent event he can think of is that he stopped Chantix approximately 3 days ago, he usually plays fantasy football with a friend and he has not done this for a week and was feeling somewhat negative about this, and he states he usually goes to [employer] meetings 2 times per day and has not been doing this. This is also complexed by a reduced workforce where he works and layoffs of his friends. Review of Systems   Constitutional: Negative. HENT: Negative. Respiratory: Negative. Cardiovascular: Negative. Gastrointestinal: Negative. Musculoskeletal: Negative. Neurological: Negative. Psychiatric/Behavioral: Positive for confusion ( Yesterday). Negative for agitation, behavioral problems, dysphoric mood, hallucinations, self-injury, sleep disturbance and suicidal ideas. The patient is not nervous/anxious. Objective:   Physical Exam  Constitutional:       General: He is not in acute distress.   Eyes:

## 2020-12-07 NOTE — TELEPHONE ENCOUNTER
Reason for Disposition   Pharmacy calling with prescription questions and triager unable to answer question    Answer Assessment - Initial Assessment Questions  1. NAME of MEDICATION: \"What medicine are you calling about? \"      Chantix    2. QUESTION: Vanessa Mao is your question? \"      Pt's girlfriend reports pt quit taking Chantix on Friday and last evening pt was disoriented, garbled speech, and was suicidal. Pt's girlfriend called the squad and they think it was necessary to him to the hospital. Pt's symptoms lasted from at least 1300- 2100 last evening. Pt reports he is not suicidal currently and is A/O now. Pt's girlfriend reports pt attempted suicide 17 years ago and was hospitalized, also reports two other \"mental breaks\"    3. PRESCRIBING HCP: \"Who prescribed it? \" Reason: if prescribed by specialist, call should be referred to that group. Dr. Yaz Rubio    4. SYMPTOMS: \"Do you have any symptoms? \"      See above    5. SEVERITY: If symptoms are present, ask \"Are they mild, moderate or severe? \"      Were severe yesterday, now symptoms are not present     6. PREGNANCY:  \"Is there any chance that you are pregnant? \" \"When was your last menstrual period? \"      N/A    Protocols used: MEDICATION QUESTION CALL-ADULT-[place]    Patient called pre-service center [facility] with red flag complaint. Brief description of triage: Pt's girlfriend calls in stating that pt quit taking his Chantix on Saturday and was extremely disoriented and suicidal last evening. EMS eval last evening and did not feel pt required hospital care. Pt is currently A/O and denies SI. Triage indicates for patient to speak with PCP within 1 hour. Spoke with Brenda Ponce at [facility] who will facilitate appt scheduling. Care advice provided, patient verbalizes understanding; denies any other questions or concerns; instructed to call back for any new or worsening symptoms. Attention Provider:  Thank you for allowing me to participate in the care of your patient. The patient was connected to triage in response to information provided to the E[facility]. Please do not respond through this encounter as the response is not directed to a shared pool.

## 2021-01-18 ENCOUNTER — TELEPHONE (OUTPATIENT)
Dept: FAMILY MEDICINE CLINIC | Age: 56
End: 2021-01-18

## 2021-01-27 DIAGNOSIS — I10 ESSENTIAL HYPERTENSION: ICD-10-CM

## 2021-01-27 RX ORDER — AMLODIPINE BESYLATE 10 MG/1
TABLET ORAL
Qty: 90 TABLET | Refills: 0 | Status: SHIPPED | OUTPATIENT
Start: 2021-01-27 | End: 2021-04-28

## 2021-01-27 NOTE — TELEPHONE ENCOUNTER
Medication:   Requested Prescriptions     Pending Prescriptions Disp Refills    amLODIPine (NORVASC) 10 MG tablet [Pharmacy Med Name: amLODIPine BESYLATE 10 MG TAB] 30 tablet 0     Sig: TAKE ONE TABLET BY MOUTH DAILY       Last Filled:  7/16/20 #90, 1 RF     Patient Phone Number: [phone] (home)     Last appt: 12/7/20 discuss medications       Lab Results   Component Value Date     08/01/2018    K 2.9 (LL) 08/01/2018     08/01/2018    CO2 29 08/01/2018    BUN 12 08/01/2018    CREATININE 0.8 (L) 08/01/2018    GLUCOSE 101 (H) 08/01/2018    CALCIUM 9.2 08/01/2018    PROT 6.7 08/01/2018    LABALBU 4.2 08/01/2018    BILITOT 0.9 08/01/2018    ALKPHOS 75 08/01/2018    AST 23 08/01/2018    ALT 19 08/01/2018    LABGLOM >60 08/01/2018    GFRAA >60 08/01/2018    AGRATIO 1.7 08/01/2018    GLOB 2.5 08/01/2018

## 2021-04-15 ENCOUNTER — PATIENT MESSAGE (OUTPATIENT)
Dept: FAMILY MEDICINE CLINIC | Age: 56
End: 2021-04-15

## 2021-04-15 NOTE — TELEPHONE ENCOUNTER
From: Kim Penn  To:  Timm Collet, MD  Sent: 4/15/2021 4:33 PM EDT  Subject: Non-Urgent Medical Question    Here is my COVID-19 card

## 2021-04-28 DIAGNOSIS — I10 ESSENTIAL HYPERTENSION: ICD-10-CM

## 2021-04-28 RX ORDER — AMLODIPINE BESYLATE 10 MG/1
TABLET ORAL
Qty: 90 TABLET | Refills: 2 | Status: SHIPPED | OUTPATIENT
Start: 2021-04-28

## 2021-04-28 NOTE — TELEPHONE ENCOUNTER
Medication:   Requested Prescriptions     Pending Prescriptions Disp Refills    amLODIPine (NORVASC) 10 MG tablet [Pharmacy Med Name: amLODIPine BESYLATE 10 MG TAB] 30 tablet 0     Sig: TAKE ONE TABLET BY MOUTH DAILY       Last Filled:  1/27/21 #90, 0 RF     Patient Phone Number: [phone] (home)     Last appt: 12/7/20 discuss medications   Next appt: Visit date not found    Lab Results   Component Value Date     08/01/2018    K 2.9 (LL) 08/01/2018     08/01/2018    CO2 29 08/01/2018    BUN 12 08/01/2018    CREATININE 0.8 (L) 08/01/2018    GLUCOSE 101 (H) 08/01/2018    CALCIUM 9.2 08/01/2018    PROT 6.7 08/01/2018    LABALBU 4.2 08/01/2018    BILITOT 0.9 08/01/2018    ALKPHOS 75 08/01/2018    AST 23 08/01/2018    ALT 19 08/01/2018    LABGLOM >60 08/01/2018    GFRAA >60 08/01/2018    AGRATIO 1.7 08/01/2018    GLOB 2.5 08/01/2018

## 2021-04-30 RX ORDER — VARENICLINE TARTRATE 1 MG/1
1 TABLET, FILM COATED ORAL 2 TIMES DAILY
Qty: 60 TABLET | Refills: 2 | Status: SHIPPED | OUTPATIENT
Start: 2021-04-30

## 2021-07-16 DIAGNOSIS — E87.6 HYPOKALEMIA: ICD-10-CM

## 2021-07-16 DIAGNOSIS — I10 ESSENTIAL HYPERTENSION: ICD-10-CM

## 2021-07-16 RX ORDER — POTASSIUM CHLORIDE 750 MG/1
TABLET, FILM COATED, EXTENDED RELEASE ORAL
Qty: 30 TABLET | Refills: 1 | Status: SHIPPED | OUTPATIENT
Start: 2021-07-16 | End: 2021-09-13

## 2021-07-16 RX ORDER — VALSARTAN AND HYDROCHLOROTHIAZIDE 320; 12.5 MG/1; MG/1
TABLET, FILM COATED ORAL
Qty: 30 TABLET | Refills: 1 | Status: SHIPPED | OUTPATIENT
Start: 2021-07-16 | End: 2021-09-13

## 2021-07-16 NOTE — LETTER
[address]  Phone: [phone]  Fax: [phone]    Grace Mcfadden MD        July 20, 2021      Dear Marianne Best,    We are reaching out because it looks like our office has been trying to contact you. Please call our office at [phone] at your earliest convenience.       Sincerely,          Grace Mcfadden MD

## 2021-12-10 DIAGNOSIS — I10 ESSENTIAL HYPERTENSION: ICD-10-CM

## 2021-12-10 RX ORDER — TAMSULOSIN HYDROCHLORIDE 0.4 MG/1
CAPSULE ORAL
Qty: 10 CAPSULE | Refills: 0 | Status: SHIPPED | OUTPATIENT
Start: 2021-12-10 | End: 2021-12-23

## 2021-12-10 RX ORDER — VALSARTAN AND HYDROCHLOROTHIAZIDE 320; 12.5 MG/1; MG/1
TABLET, FILM COATED ORAL
Qty: 10 TABLET | Refills: 0 | Status: SHIPPED | OUTPATIENT
Start: 2021-12-10 | End: 2021-12-23

## 2021-12-10 NOTE — TELEPHONE ENCOUNTER
I sent 10 pills to the pharmacy for each one of his prescriptions carry him until he can get an appointment. I believe he has been told multiple times that he is due for an appointment. Let him know that he needs to make an appointment for any future refills.

## 2021-12-14 NOTE — TELEPHONE ENCOUNTER
Called patient and was unable to reach, left a VM to call and schedule an appointment to continue to get any more medication refills.

## 2021-12-25 DIAGNOSIS — I10 ESSENTIAL HYPERTENSION: ICD-10-CM

## 2021-12-25 NOTE — LETTER
Suha Frye MD  [address] [facility]  [address]  Dept: [phone]  Loc: [phone]          December 30, 2021     Faiza Tanner   [address] [address]      Dear Brandon Paget: We are sending this letter to inform you that our office has been trying to contact you. Please give our office a call at your earliest convenience.         Sincerely,       [facility]

## 2021-12-27 RX ORDER — TAMSULOSIN HYDROCHLORIDE 0.4 MG/1
CAPSULE ORAL
Qty: 5 CAPSULE | Refills: 0 | OUTPATIENT
Start: 2021-12-27

## 2021-12-27 RX ORDER — VALSARTAN AND HYDROCHLOROTHIAZIDE 320; 12.5 MG/1; MG/1
TABLET, FILM COATED ORAL
Qty: 5 TABLET | Refills: 0 | OUTPATIENT
Start: 2021-12-27

## 2021-12-30 NOTE — TELEPHONE ENCOUNTER
3rd attempt to contact pt.  Okay to close per note before I also left a message to the pharmacy to let pt know to call us to schedule

## 2022-01-20 DIAGNOSIS — I10 ESSENTIAL HYPERTENSION: ICD-10-CM

## 2022-01-20 RX ORDER — AMLODIPINE BESYLATE 10 MG/1
TABLET ORAL
Qty: 30 TABLET | Refills: 2 | OUTPATIENT
Start: 2022-01-20

## 2022-01-20 NOTE — TELEPHONE ENCOUNTER
Bertha Naranjo  [phone] Cathi No stated patient needs an office visit for any refills. We have tried to reach the patient on the last 7 refill request to inform patient that he needs to be seen with for future refills. Each encounter we have tried multiple times. Called pharmacy and they stated that they tried to reach the patient to relay the message as well with the last refill and was unable to get a hold of the patient.

## 2022-01-20 NOTE — TELEPHONE ENCOUNTER
Medication:   Requested Prescriptions     Pending Prescriptions Disp Refills    amLODIPine (NORVASC) 10 MG tablet [Pharmacy Med Name: amLODIPine BESYLATE 10 MG TAB] 30 tablet 2     Sig: TAKE ONE TABLET BY MOUTH DAILY       Last Filled:  4/28/21 with 2 refills    Patient Phone Number: [phone] (home)     Last appt: Visit date not found   Next appt: Visit date not found    Lab Results   Component Value Date     08/01/2018    K 2.9 (LL) 08/01/2018     08/01/2018    CO2 29 08/01/2018    BUN 12 08/01/2018    CREATININE 0.8 (L) 08/01/2018    GLUCOSE 101 (H) 08/01/2018    CALCIUM 9.2 08/01/2018    PROT 6.7 08/01/2018    LABALBU 4.2 08/01/2018    BILITOT 0.9 08/01/2018    ALKPHOS 75 08/01/2018    AST 23 08/01/2018    ALT 19 08/01/2018    LABGLOM >60 08/01/2018    GFRAA >60 08/01/2018    AGRATIO 1.7 08/01/2018    GLOB 2.5 08/01/2018

## 2023-03-01 ENCOUNTER — HOSPITAL ENCOUNTER (EMERGENCY)
Age: 58
Discharge: HOME | End: 2023-03-01
Attending: EMERGENCY MEDICINE
Payer: COMMERCIAL

## 2023-03-01 VITALS
DIASTOLIC BLOOD PRESSURE: 81 MMHG | BODY MASS INDEX: 24.36 KG/M2 | SYSTOLIC BLOOD PRESSURE: 151 MMHG | OXYGEN SATURATION: 97 % | WEIGHT: 174 LBS | TEMPERATURE: 97.9 F | HEIGHT: 71 IN | RESPIRATION RATE: 17 BRPM | HEART RATE: 94 BPM

## 2023-03-01 DIAGNOSIS — I10 UNCONTROLLED HYPERTENSION: ICD-10-CM

## 2023-03-01 DIAGNOSIS — R04.0 EPISTAXIS: Primary | ICD-10-CM

## 2023-03-01 LAB
A/G RATIO: 1.3 (ref 1.1–2.2)
ABO/RH: NORMAL
ALBUMIN SERPL-MCNC: 4.2 G/DL (ref 3.4–5)
ALP BLD-CCNC: 76 U/L (ref 40–129)
ALT SERPL-CCNC: 28 U/L (ref 10–40)
ANION GAP SERPL CALCULATED.3IONS-SCNC: 14 MMOL/L (ref 3–16)
ANTIBODY SCREEN: NORMAL
APTT: 27.9 SEC (ref 23–34.3)
AST SERPL-CCNC: 25 U/L (ref 15–37)
BASOPHILS ABSOLUTE: 0 K/UL (ref 0–0.2)
BASOPHILS RELATIVE PERCENT: 0.3 %
BILIRUB SERPL-MCNC: 1 MG/DL (ref 0–1)
BUN BLDV-MCNC: 20 MG/DL (ref 7–20)
CALCIUM SERPL-MCNC: 9 MG/DL (ref 8.3–10.6)
CHLORIDE BLD-SCNC: 103 MMOL/L (ref 99–110)
CO2: 24 MMOL/L (ref 21–32)
CREAT SERPL-MCNC: 0.8 MG/DL (ref 0.9–1.3)
EOSINOPHILS ABSOLUTE: 0 K/UL (ref 0–0.6)
EOSINOPHILS RELATIVE PERCENT: 0.2 %
GFR SERPL CREATININE-BSD FRML MDRD: >60 ML/MIN/{1.73_M2}
GLUCOSE BLD-MCNC: 130 MG/DL (ref 70–99)
HCT VFR BLD CALC: 42.6 % (ref 40.5–52.5)
HEMOGLOBIN: 14.3 G/DL (ref 13.5–17.5)
INR BLD: 1.03 (ref 0.87–1.14)
LYMPHOCYTES ABSOLUTE: 0.9 K/UL (ref 1–5.1)
LYMPHOCYTES RELATIVE PERCENT: 8.4 %
MAGNESIUM: 1.5 MG/DL (ref 1.8–2.4)
MCH RBC QN AUTO: 33.4 PG (ref 26–34)
MCHC RBC AUTO-ENTMCNC: 33.5 G/DL (ref 31–36)
MCV RBC AUTO: 99.6 FL (ref 80–100)
MONOCYTES ABSOLUTE: 1.3 K/UL (ref 0–1.3)
MONOCYTES RELATIVE PERCENT: 12 %
NEUTROPHILS ABSOLUTE: 8.7 K/UL (ref 1.7–7.7)
NEUTROPHILS RELATIVE PERCENT: 79.1 %
PDW BLD-RTO: 13.8 % (ref 12.4–15.4)
PLATELET # BLD: 230 K/UL (ref 135–450)
PMV BLD AUTO: 8.9 FL (ref 5–10.5)
POTASSIUM REFLEX MAGNESIUM: 3.5 MMOL/L (ref 3.5–5.1)
PROTHROMBIN TIME: 13.4 SEC (ref 11.7–14.5)
RBC # BLD: 4.28 M/UL (ref 4.2–5.9)
SODIUM BLD-SCNC: 141 MMOL/L (ref 136–145)
TOTAL PROTEIN: 7.4 G/DL (ref 6.4–8.2)
WBC # BLD: 11 K/UL (ref 4–11)

## 2023-03-01 PROCEDURE — 85610 PROTHROMBIN TIME: CPT

## 2023-03-01 PROCEDURE — 85025 COMPLETE CBC W/AUTO DIFF WBC: CPT

## 2023-03-01 PROCEDURE — 2580000003 HC RX 258: Performed by: EMERGENCY MEDICINE

## 2023-03-01 PROCEDURE — 6370000000 HC RX 637 (ALT 250 FOR IP): Performed by: EMERGENCY MEDICINE

## 2023-03-01 PROCEDURE — 96375 TX/PRO/DX INJ NEW DRUG ADDON: CPT

## 2023-03-01 PROCEDURE — 30903 CONTROL OF NOSEBLEED: CPT

## 2023-03-01 PROCEDURE — 36415 COLL VENOUS BLD VENIPUNCTURE: CPT

## 2023-03-01 PROCEDURE — 2500000003 HC RX 250 WO HCPCS: Performed by: EMERGENCY MEDICINE

## 2023-03-01 PROCEDURE — 96374 THER/PROPH/DIAG INJ IV PUSH: CPT

## 2023-03-01 PROCEDURE — 99284 EMERGENCY DEPT VISIT MOD MDM: CPT

## 2023-03-01 PROCEDURE — 85730 THROMBOPLASTIN TIME PARTIAL: CPT

## 2023-03-01 PROCEDURE — 86850 RBC ANTIBODY SCREEN: CPT

## 2023-03-01 PROCEDURE — 6360000002 HC RX W HCPCS: Performed by: EMERGENCY MEDICINE

## 2023-03-01 PROCEDURE — 83735 ASSAY OF MAGNESIUM: CPT

## 2023-03-01 PROCEDURE — 86901 BLOOD TYPING SEROLOGIC RH(D): CPT

## 2023-03-01 PROCEDURE — 30901 CONTROL OF NOSEBLEED: CPT

## 2023-03-01 PROCEDURE — 80053 COMPREHEN METABOLIC PANEL: CPT

## 2023-03-01 PROCEDURE — 30905 CONTROL OF NOSEBLEED: CPT

## 2023-03-01 PROCEDURE — 86900 BLOOD TYPING SEROLOGIC ABO: CPT

## 2023-03-01 RX ORDER — VALSARTAN AND HYDROCHLOROTHIAZIDE 320; 12.5 MG/1; MG/1
1 TABLET, FILM COATED ORAL DAILY
Qty: 30 TABLET | Refills: 0 | Status: SHIPPED | OUTPATIENT
Start: 2023-03-01

## 2023-03-01 RX ORDER — 0.9 % SODIUM CHLORIDE 0.9 %
1000 INTRAVENOUS SOLUTION INTRAVENOUS ONCE
Status: COMPLETED | OUTPATIENT
Start: 2023-03-01 | End: 2023-03-01

## 2023-03-01 RX ORDER — METOPROLOL TARTRATE 5 MG/5ML
5 INJECTION INTRAVENOUS ONCE
Status: COMPLETED | OUTPATIENT
Start: 2023-03-01 | End: 2023-03-01

## 2023-03-01 RX ORDER — CEPHALEXIN 250 MG/1
500 CAPSULE ORAL ONCE
Status: COMPLETED | OUTPATIENT
Start: 2023-03-01 | End: 2023-03-01

## 2023-03-01 RX ORDER — ONDANSETRON 2 MG/ML
4 INJECTION INTRAMUSCULAR; INTRAVENOUS
Status: DISCONTINUED | OUTPATIENT
Start: 2023-03-01 | End: 2023-03-01 | Stop reason: HOSPADM

## 2023-03-01 RX ORDER — CEPHALEXIN 500 MG/1
500 CAPSULE ORAL 4 TIMES DAILY
Qty: 20 CAPSULE | Refills: 0 | Status: SHIPPED | OUTPATIENT
Start: 2023-03-01 | End: 2023-03-06

## 2023-03-01 RX ORDER — LORAZEPAM 2 MG/ML
1 INJECTION INTRAMUSCULAR ONCE
Status: COMPLETED | OUTPATIENT
Start: 2023-03-01 | End: 2023-03-01

## 2023-03-01 RX ORDER — POTASSIUM CHLORIDE 750 MG/1
10 TABLET, EXTENDED RELEASE ORAL DAILY
Qty: 30 TABLET | Refills: 0 | Status: SHIPPED | OUTPATIENT
Start: 2023-03-01

## 2023-03-01 RX ORDER — OXYMETAZOLINE HYDROCHLORIDE 0.05 G/100ML
2 SPRAY NASAL ONCE
Status: COMPLETED | OUTPATIENT
Start: 2023-03-01 | End: 2023-03-01

## 2023-03-01 RX ORDER — AMLODIPINE BESYLATE 10 MG/1
10 TABLET ORAL DAILY
Qty: 30 TABLET | Refills: 0 | Status: SHIPPED | OUTPATIENT
Start: 2023-03-01

## 2023-03-01 RX ADMIN — OXYMETAZOLINE HCL 2 SPRAY: 0.05 SPRAY NASAL at 01:33

## 2023-03-01 RX ADMIN — ONDANSETRON 4 MG: 2 INJECTION INTRAMUSCULAR; INTRAVENOUS at 01:33

## 2023-03-01 RX ADMIN — SODIUM CHLORIDE 1000 ML: 9 INJECTION, SOLUTION INTRAVENOUS at 01:41

## 2023-03-01 RX ADMIN — LORAZEPAM 1 MG: 2 INJECTION, SOLUTION INTRAMUSCULAR; INTRAVENOUS at 01:41

## 2023-03-01 RX ADMIN — METOPROLOL TARTRATE 5 MG: 5 INJECTION, SOLUTION INTRAVENOUS at 01:33

## 2023-03-01 RX ADMIN — CEPHALEXIN 500 MG: 250 CAPSULE ORAL at 02:49

## 2023-03-01 ASSESSMENT — ENCOUNTER SYMPTOMS
SHORTNESS OF BREATH: 0
CHEST TIGHTNESS: 0
ABDOMINAL PAIN: 0
DIARRHEA: 0
NAUSEA: 0
VOMITING: 0

## 2023-03-01 NOTE — ED PROVIDER NOTES
I independently saw performed a substantive portion of the visit (history, physical, and MDM) on [address]. All diagnostic, treatment, and disposition decisions were made by myself in conjunction with the advanced practice provider. I have participated in the medical decision making and directed the treatment plan and disposition of the patient. For further details of Speedy Tanner's emergency department encounter, please see the advanced practice provider's documentation. Yuly Choe MD, am the primary physician provider of record. CHIEF COMPLAINT  Chief Complaint   Patient presents with    Epistaxis     Pt via EMS from home, c/o nosebleed on and off since 3 pm, couldn't get it to stop. Is not on blood thinners, pt hypertensive and tachy in triage     Briefly, Irena Correia is a 65 y.o. male  who presents to the ED complaining of nosebleeds for the past couple of days however has been fairly persistent since around 3 PM.  He arrives coughing and gagging and retching from blood going down the back of his throat that is gagging him. He says he cannot tell if it is coming from the left or right side currently. He arrives notably hypertensive and tachycardic. He is not anticoagulated and also noncompliant with his blood pressure medications. He adamantly denies any stimulant use including cocaine amphetamines or other stimulant medications or drugs. He has never had intractable nosebleed like this before. Denies any facial trauma. Denies headache chest pain or shortness of breath. He has not had syncope. FOCUSED PHYSICAL EXAMINATION  BP (!) 151/81   Pulse 94   Temp 97.9 °F (36.6 °C) (Oral)   Resp 17   Ht 5' 11\" (1.803 m)   Wt 174 lb (78.9 kg)   SpO2 97%   BMI 24.27 kg/m²    Focused physical examination notable for mild acute distress, well-appearing, well-nourished, normal speech and mentation without obvious facial droop, no obvious rash.   No obvious cranial nerve deficits on my initial exam.  Coughing and gagging from blood coming out of the posterior oropharynx. When the nasal clamp was removed, blood is coming out of both nostrils but left more than right. I do not visualize any specific vessel amenable to cautery on my initial assessment. No nasal tenderness or bruising present. No septal hematoma. Tachycardic, regular rhythm, clear to auscultation bilaterally although breathing rapidly and shallow from his coughing and gagging. His breathing is unlabored overall though. EKG performed in ED:  None        ED COURSE/MDM  Patient seen and evaluated. Old records reviewed. Labs and imaging reviewed. The patient's ED workup was notable for initial concern for epistaxis with tachycardia and hypertension. He no longer sees his previous PCP and has been out of his medications but is wanting to take them again for his hypertension. This is a possible causative agent of his epistaxis today. No trauma to warrant CT imaging although I did consider it. He also is adamant that he denies any drug use but does smoke and drink alcohol. Given his abnormal vital signs with epistaxis on initial assessment I did obtain blood work to evaluate for the possibility of anemia but his hemoglobin is stable at 14.3 and he is not hypotensive or unstable otherwise. Additionally, he has no thrombocytopenia or other coagulopathy to explain his symptoms and his kidney function is unremarkable. See HENNY note for procedure details, no significant success with Afrin alone and as such a left-sided rapid Rhino was placed and subsequent observation with hemostasis obtained and the patient is tolerating it quite well.   He will be given Keflex for antibiotic prophylaxis and referred to ENT for follow-up within several days for reassessment and packing removal.  I also made a primary care referral for him and will refill his previous prescriptions of amlodipine, Diovan and potassium in addition to the Keflex prescription. He did receive a dose of IV Lopressor here for initial BP/HR management. Is this patient to be included in the SEP-1 Core Measure? No   Exclusion criteria - the patient is NOT to be included for SEP-1 Core Measure due to: Infection is not suspected      Consults:  None    History obtained from: Patient and EMS    Pertinent social determinants of health:  medical noncompliance  / no PCP    Chronic conditions potentially affecting care:  HTN    Review of other records:  None    Reassessment:  See [facility] for details of medications given and reassessment    During the patient's ED course, the patient was given:  Medications   ondansetron (ZOFRAN) injection 4 mg (4 mg IntraVENous Given 3/1/23 0133)   cephALEXin (KEFLEX) capsule 500 mg (has no administration in time range)   metoprolol (LOPRESSOR) injection 5 mg (5 mg IntraVENous Given 3/1/23 0133)   0.9 % sodium chloride IV bolus 1,000 mL (0 mLs IntraVENous Stopped 3/1/23 0242)   oxymetazoline (AFRIN) 0.05 % nasal spray 2 spray (2 sprays Each Nostril Given 3/1/23 0133)   LORazepam (ATIVAN) injection 1 mg (1 mg IntraVENous Given 3/1/23 0141)        CLINICAL IMPRESSION  1. Epistaxis    2. Uncontrolled hypertension        Blood pressure (!) 151/81, pulse 94, temperature 97.9 °F (36.6 °C), temperature source Oral, resp. rate 17, height 5' 11\" (1.803 m), weight 174 lb (78.9 kg), SpO2 97 %. Tricia Amaro was discharged in stable condition. I have discussed the findings of today's workup with the patient and addressed the patient's questions and concerns. Important warning signs as well as new or worsening symptoms which would necessitate immediate return to the ED were discussed. The plan is to discharge from the ED at this time, and the patient is in stable condition. The patient acknowledged understanding is agreeable with this plan.     Patient was given scripts for the following medications. I counseled patient how to take these medications. New Prescriptions    AMLODIPINE (NORVASC) 10 MG TABLET    Take 1 tablet by mouth daily    CEPHALEXIN (KEFLEX) 500 MG CAPSULE    Take 1 capsule by mouth 4 times daily for 5 days    POTASSIUM CHLORIDE (KLOR-CON M) 10 MEQ EXTENDED RELEASE TABLET    Take 1 tablet by mouth daily    VALSARTAN-HYDROCHLOROTHIAZIDE (DIOVAN-HCT) 320-12.5 MG PER TABLET    Take 1 tablet by mouth daily       Follow-up with:  Viktor Clement [address], [address] [facility] [address]  [phone]    Schedule an appointment as soon as possible for a visit in 2 days  For symptom re-evaluation and packing removal    Critical care time:  None    DISCLAIMER: This chart was created using Dragon dictation software. Efforts were made by me to ensure accuracy, however some errors may be present due to limitations of this technology and occasionally words are not transcribed correctly.           Sheryl Avila MD  03/01/23 9331

## 2023-03-02 ASSESSMENT — ENCOUNTER SYMPTOMS
ABDOMINAL PAIN: 0
NAUSEA: 0
CHEST TIGHTNESS: 0
SHORTNESS OF BREATH: 0
VOMITING: 0
DIARRHEA: 0

## 2023-03-02 NOTE — ED PROVIDER NOTES
[facility] EMERGENCY DEPARTMENT  EMERGENCY DEPARTMENT ENCOUNTER        Pt Name: Vel Tanner  MRN: [id number]  Birthdate 1965  Date of evaluation: 3/1/2023  Provider: KAELYN Hoang - ANGI  PCP: Ean Jernigan MD  Note Started: 7:05 PM EST 3/1/23       I have seen and evaluated this patient with my supervising physician godwin      CHIEF COMPLAINT       Chief Complaint   Patient presents with    Epistaxis     Pt via EMS from home, c/o nosebleed on and off since 3 pm, couldn't get it to stop. Is not on blood thinners, pt hypertensive and tachy in triage       HISTORY OF PRESENT ILLNESS: 1 or more Elements     History from : Patient    Limitations to history : None    Vel Tanner is a 57 y.o. male who presents to the ER with complaint of left sided nasal bleeding.  States that his nose has been bleeding since 1830.  No injury or trauma.  No headache.  Patient reports that he no longer sees Dr. Jernigan and is out of his blood pressure medication.      Denies any headache, fever, lightheadedness, dizziness, visual disturbances.  No chest pain or pressure.  No neck or back pain.  No shortness of breath, cough, or congestion.  No abdominal pain, nausea, vomiting, diarrhea, constipation, or dysuria.  No rash.    Nursing Notes were all reviewed and agreed with or any disagreements were addressed in the HPI.    REVIEW OF SYSTEMS :      Review of Systems   Constitutional:  Negative for activity change, chills and fever.   HENT:  Positive for nosebleeds.    Respiratory:  Negative for chest tightness and shortness of breath.    Cardiovascular:  Negative for chest pain.   Gastrointestinal:  Negative for abdominal pain, diarrhea, nausea and vomiting.   Genitourinary:  Negative for dysuria.   All other systems reviewed and are negative.    Positives and Pertinent negatives as per HPI.     SURGICAL HISTORY     Past Surgical History:   Procedure Laterality Date    HERNIA REPAIR  1982  [address]       Discharge Medication List as of 3/1/2023  2:43 AM          ALLERGIES     Patient has no known allergies. FAMILYHISTORY       Family History   Problem Relation Age of Onset    Cancer Mother     Cancer Father         liver;  age 68, 2019: pneumonia    Hypertension Sister         SOCIAL HISTORY       Social History     Tobacco Use    Smoking status: Former     Types: Cigarettes     Quit date: 3/1/2016     Years since quittin.0    Smokeless tobacco: Never   Substance Use Topics    Alcohol use: No     Alcohol/week: 0.0 standard drinks     Comment: Goes to AA routinely    Drug use: No       SCREENINGS                         CIWA Assessment  BP: (!) 151/81  Heart Rate: 94           PHYSICAL EXAM  1 or more Elements     ED Triage Vitals [23 0112]   BP Temp Temp Source Heart Rate Resp SpO2 Height Weight   (!) 187/121 97.9 °F (36.6 °C) Oral (!) 123 23 97 % 5' 11\" (1.803 m) 174 lb (78.9 kg)       Physical Exam  Vitals and nursing note reviewed. Constitutional:       Appearance: He is well-developed. He is not diaphoretic. HENT:      Head: Normocephalic and atraumatic. Right Ear: Tympanic membrane and external ear normal.      Left Ear: Tympanic membrane and external ear normal.      Nose:      Left Nostril: Epistaxis present. Comments: Pouring bright red blood from left nostril- slowed with clamp  Eyes:      General:         Right eye: No discharge. Left eye: No discharge. Neck:      Vascular: No JVD. Cardiovascular:      Rate and Rhythm: Tachycardia present. Pulses: Normal pulses. Heart sounds: Normal heart sounds. Pulmonary:      Effort: Pulmonary effort is normal. No respiratory distress. Breath sounds: Normal breath sounds. Abdominal:      Palpations: Abdomen is soft. Musculoskeletal:         General: Normal range of motion. Skin:     General: Skin is warm and dry. Coloration: Skin is not pale.    Neurological: Mental Status: He is alert. Psychiatric:         Behavior: Behavior normal.           DIAGNOSTIC RESULTS   LABS:    Labs Reviewed   CBC WITH AUTO DIFFERENTIAL - Abnormal; Notable for the following components:       Result Value    Neutrophils Absolute 8.7 (*)     Lymphocytes Absolute 0.9 (*)     All other components within normal limits   COMPREHENSIVE METABOLIC PANEL W/ REFLEX TO MG FOR LOW K - Abnormal; Notable for the following components:    Glucose 130 (*)     Creatinine 0.8 (*)     All other components within normal limits   MAGNESIUM - Abnormal; Notable for the following components:    Magnesium 1.50 (*)     All other components within normal limits   PROTIME-INR   APTT   TYPE AND SCREEN       When ordered only abnormal lab results are displayed. All other labs were within normal range or not returned as of this dictation. EKG: When ordered, EKG's are interpreted by the Emergency Department Physician in the absence of a cardiologist.  Please see their note for interpretation of EKG. RADIOLOGY:   Non-plain film images such as CT, Ultrasound and MRI are read by the radiologist. Plain radiographic images are visualized and preliminarily interpreted by the ED Provider with the below findings:        Interpretation per the Radiologist below, if available at the time of this note:    No orders to display     No results found. No results found.     PROCEDURES   Unless otherwise noted below, none     Epistaxis Mgmt    Date/Time: 3/1/2023 12:30 AM  Performed by: KAELYN Carrera - CNP  Authorized by: Ygoi Salas MD     Consent:     Consent obtained:  Verbal    Consent given by:  Patient    Risks, benefits, and alternatives were discussed: yes      Risks discussed:  Bleeding, nasal injury, pain and infection    Alternatives discussed:  No treatment  Universal protocol:     Procedure explained and questions answered to patient or proxy's satisfaction: yes      Patient identity confirmed: Verbally with patient  Anesthesia:     Anesthesia method:  None  Procedure details:     Treatment site:  L posterior    Treatment method:  Nasal tampon    Treatment episode: initial    Post-procedure details:     Assessment:  Bleeding stopped    Procedure completion:  Tolerated    CRITICAL CARE TIME (.cctime)   There was a high probability of life-threatening clinical deterioration in the patient's condition requiring my urgent intervention. I personally saw the patient and independently provided 31 minutes of non-concurrent critical care out of the total shared critical care time provided, excluding separately reportable procedures. PAST MEDICAL HISTORY      has a past medical history of Essential hypertension (6/17/2016). Chronic Conditions affecting Care: hypertension    EMERGENCY DEPARTMENT COURSE and DIFFERENTIAL DIAGNOSIS/MDM:   Vitals:    Vitals:    03/01/23 0112 03/01/23 0200 03/01/23 0211 03/01/23 0233   BP: (!) 187/121 (!) (P) 178/112 (!) 178/112 (!) 151/81   Pulse: (!) 123 (!) (P) 101 (!) 101 94   Resp: 23 (P) 21 21 17   Temp: 97.9 °F (36.6 °C)      TempSrc: Oral      SpO2: 97%  95% 97%   Weight: 174 lb (78.9 kg)      Height: 5' 11\" (1.803 m)          Patient was given the following medications:  Medications   metoprolol (LOPRESSOR) injection 5 mg (5 mg IntraVENous Given 3/1/23 0133)   0.9 % sodium chloride IV bolus 1,000 mL (0 mLs IntraVENous Stopped 3/1/23 0242)   oxymetazoline (AFRIN) 0.05 % nasal spray 2 spray (2 sprays Each Nostril Given 3/1/23 0133)   LORazepam (ATIVAN) injection 1 mg (1 mg IntraVENous Given 3/1/23 0141)   cephALEXin (KEFLEX) capsule 500 mg (500 mg Oral Given 3/1/23 0249)             Is this patient to be included in the SEP-1 Core Measure due to severe sepsis or septic shock? No   Exclusion criteria - the patient is NOT to be included for SEP-1 Core Measure due to:   Infection is not suspected    CONSULTS: (Who and What was discussed)  None  Discussion with Other Profesionals : None    Social Determinants : no pcp    Records Reviewed : None    CC/HPI Summary, DDx, ED Course, and Reassessment:     Briefly, this is a 65year old male  who presents to the ER with complaint of left sided nasal bleeding. States that his nose has been bleeding since 1830. No injury or trauma. No headache. Patient reports that he no longer sees Dr. Ronny Pope and is out of his blood pressure medication. Iv was established and labs collected. Patient's blood pressure is very high upon arrival, ordered metoprolol iv to be given and iv fluids. Also iv ativan to be given. Patient reports that he does not use any illegal drugs and is trying to stop using alcohol. Afrin was instilled in bilateral nares and nasal packing placed in left nostril- see procedure note above. Bleeding did stop. Keflex ordered for home and follow up with ENT. Labs are unremarkable. Diovan and potassium in addition to potassium were written for home. Return for new or worsening symptoms. Disposition Considerations (include 1 Tests not done, Shared Decision Making, Pt Expectation of Test or Tx.): I did consider ct head    Shared decision making used throughout  Appropriate for outpatient management follow up      I am the Primary Clinician of Record. FINAL IMPRESSION      1. Epistaxis    2.  Uncontrolled hypertension          DISPOSITION/PLAN     DISPOSITION Decision To Discharge 03/01/2023 02:33:21 AM      PATIENT REFERRED TO:  [facility]) Pre-Services  [address], [address] [place] [address]/ [facility] [id number] [phone]    Schedule an appointment as soon as possible for a visit in 2 days  For symptom re-evaluation and packing removal      DISCHARGE MEDICATIONS:  Discharge Medication List as of 3/1/2023  2:43 AM        START taking these medications    Details   potassium chloride (KLOR-CON M) 10 MEQ extended release tablet Take 1 tablet by mouth daily, Disp-30 tablet, R-0Normal      cephALEXin (KEFLEX) 500 MG capsule Take 1 capsule by mouth 4 times daily for 5 days, Disp-20 capsule, R-0Normal             DISCONTINUED MEDICATIONS:  Discharge Medication List as of 3/1/2023  2:43 AM        STOP taking these medications       potassium chloride (KLOR-CON) 10 MEQ extended release tablet Comments:   Reason for Stopping:         tamsulosin (FLOMAX) 0.4 MG capsule Comments:   Reason for Stopping:         varenicline (CHANTIX CONTINUING MONTH MATT) 1 MG tablet Comments:   Reason for Stopping:                      (Please note that portions of this note were completed with a voice recognition program.  Efforts were made to edit the dictations but occasionally words are mis-transcribed.)    KAELYN Quintana CNP (electronically signed)           KAELYN Quintana CNP  03/01/23 [place] KAELYN Cabral CNP  03/02/23 [address] [place] [address] KAELYN Oconnor CNP  03/02/23 0246

## 2023-03-03 ENCOUNTER — OFFICE VISIT (OUTPATIENT)
Dept: ENT CLINIC | Age: 58
End: 2023-03-03
Payer: COMMERCIAL

## 2023-03-03 VITALS
OXYGEN SATURATION: 98 % | HEART RATE: 130 BPM | SYSTOLIC BLOOD PRESSURE: 113 MMHG | WEIGHT: 175 LBS | HEIGHT: 71 IN | BODY MASS INDEX: 24.5 KG/M2 | DIASTOLIC BLOOD PRESSURE: 74 MMHG | TEMPERATURE: 98.7 F

## 2023-03-03 DIAGNOSIS — R04.0 EPISTAXIS: Primary | ICD-10-CM

## 2023-03-03 DIAGNOSIS — J34.2 NASAL SEPTAL DEVIATION: ICD-10-CM

## 2023-03-03 PROCEDURE — 99203 OFFICE O/P NEW LOW 30 MIN: CPT | Performed by: OTOLARYNGOLOGY

## 2023-03-03 PROCEDURE — 3074F SYST BP LT 130 MM HG: CPT | Performed by: OTOLARYNGOLOGY

## 2023-03-03 PROCEDURE — 3078F DIAST BP <80 MM HG: CPT | Performed by: OTOLARYNGOLOGY

## 2023-03-03 ASSESSMENT — ENCOUNTER SYMPTOMS
SINUS PAIN: 0
RHINORRHEA: 0
SORE THROAT: 0

## 2023-03-03 NOTE — PROGRESS NOTES
[facility]             PCP:  Marlene Dickey MD      NEW PATIENT VISIT      REFERRED BY:     [facility]        CHIEF COMPLAINT  Chief Complaint   Patient presents with    Epistaxis        HISTORY OF PRESENT ILLNESS    Heather Romero is a 65 y.o. male who presented today for referred here for evaluation and treatment of Nosebleed onset 645 am this past Monday. Called 911 12:10 AM Tuesday, transport to [facility] ER. \"Put a balloon in my nose ,couple injection. Sent home with Rapid rhino. Luis M Fareed out last night. Minimal bleeding when it came out and none since. They gave something for blood pressure, is back down. No blood thinners and no nasal trauma or recent illness. No chronic nasal dyspnea. REVIEW OF SYSTEMS   Review of Systems   Constitutional:  Negative for chills and fever. HENT:  Negative for ear discharge, ear pain, hearing loss, rhinorrhea, sinus pain, sore throat and tinnitus. PAST MEDICAL HISTORY    Past Medical History:   Diagnosis Date    Essential hypertension 6/17/2016       Past Surgical History:   Procedure Laterality Date    HERNIA REPAIR  1982         EXAMINATION    Vitals:    03/03/23 1532   BP: 113/74   Site: Left Upper Arm   Position: Sitting   Cuff Size: Medium Adult   Pulse: (!) 130   Temp: 98.7 °F (37.1 °C)   TempSrc: Temporal   SpO2: 98%   Weight: 175 lb (79.4 kg)   Height: 5' 11\" (1.803 m)     General:  WDWN, NAD, alert and oriented  Face: There was no swelling or lesions detected. Voice: Normal with no hoarseness or hot potato voice. Ears: The external ears, mastoids, TMs and EACs appeared to be normal.   (+)  Nose: NSD to the left moderate. No active bleeding. No intranasal bleeding. The external nose, turbinates, secretions, and mucosa appeared to be normal.   Sinuses:  Maxillary and frontal sinuses were nontender to palpation and percussion.     Oral cavity:  Mucosa,

## 2023-03-03 NOTE — PATIENT INSTRUCTIONS
NASAL CARE FOR NOSEBLEED       Do not pull crusts out of nose, refrain from \"nose-picking. \"    You may blow your nose gently, but do not move your nose around when blowing. Over the next two weeks, do the following:     Use a 12 hour decongestant spray, such as 0.05% oxymetazoline nasal spray (eg. Afrin). This is available \"over the counter\" at your pharmacy. [place] two sprays in each nostril three times a day for three days, then two times a day for 2 days, and then stop for two days, and then repeat the cycle once. Use a saline (salt water) nasal spray/mist, (eg. Ocean nasal saline mist, AYR nasal spray). This is available \"over the counter\" at your pharmacy. [place] two sprays in each nostril every two to three hours while you are awake, for two weeks. Use a bedside humidifier, at night, while sleeping. Use polysporin ointment in each nostril at bedtime. This is available \"over the counter\" at your pharmacy. [place] the ointment onto the lateral wall of each nostril, gently, with a Q-tip applicator, then gently pinch and rub the nostrils as instructed. (Do not apply directly to the septum middle wall of the nose)    If bleeding occurs, pinch your nostrils together in the soft part of the nose and push gently toward your face, and hold for ten minutes. If bleeding persists, then repeat. If bleeding still persists, dampen a cotton ball with Afrin and insert into your nostril and repeat the previous pinch maneuver. If nose bleed remains uncontrolled, go to emergency room. For future prevention of nose bleeds, use a saline (salt water) nasal spray/mist, (eg. Ocean nasal saline mist, AYR nasal spray). This is available \"over the counter\" at your pharmacy. [place] two sprays in each nostril two to three times daily, while you are awake.

## 2023-03-05 ENCOUNTER — HOSPITAL ENCOUNTER (EMERGENCY)
Age: 58
Discharge: HOME | End: 2023-03-05
Payer: COMMERCIAL

## 2023-03-05 VITALS
HEIGHT: 71 IN | DIASTOLIC BLOOD PRESSURE: 92 MMHG | WEIGHT: 174 LBS | SYSTOLIC BLOOD PRESSURE: 142 MMHG | RESPIRATION RATE: 16 BRPM | OXYGEN SATURATION: 98 % | TEMPERATURE: 97.6 F | HEART RATE: 102 BPM | BODY MASS INDEX: 24.36 KG/M2

## 2023-03-05 DIAGNOSIS — R04.0 EPISTAXIS: Primary | ICD-10-CM

## 2023-03-05 PROCEDURE — 30903 CONTROL OF NOSEBLEED: CPT

## 2023-03-05 PROCEDURE — 99283 EMERGENCY DEPT VISIT LOW MDM: CPT

## 2023-03-05 PROCEDURE — 6370000000 HC RX 637 (ALT 250 FOR IP): Performed by: PHYSICIAN ASSISTANT

## 2023-03-05 PROCEDURE — 2500000003 HC RX 250 WO HCPCS: Performed by: PHYSICIAN ASSISTANT

## 2023-03-05 RX ORDER — TRANEXAMIC ACID 100 MG/ML
100 INJECTION, SOLUTION INTRAVENOUS ONCE
Status: COMPLETED | OUTPATIENT
Start: 2023-03-05 | End: 2023-03-05

## 2023-03-05 RX ORDER — OXYMETAZOLINE HYDROCHLORIDE 0.05 G/100ML
2 SPRAY NASAL ONCE
Status: COMPLETED | OUTPATIENT
Start: 2023-03-05 | End: 2023-03-05

## 2023-03-05 RX ORDER — ONDANSETRON 4 MG/1
4 TABLET, ORALLY DISINTEGRATING ORAL ONCE
Status: COMPLETED | OUTPATIENT
Start: 2023-03-05 | End: 2023-03-05

## 2023-03-05 RX ADMIN — ONDANSETRON 4 MG: 4 TABLET, ORALLY DISINTEGRATING ORAL at 21:21

## 2023-03-05 RX ADMIN — TRANEXAMIC ACID 100 MG: 100 INJECTION, SOLUTION INTRAVENOUS at 21:22

## 2023-03-05 RX ADMIN — OXYMETAZOLINE HCL 2 SPRAY: 0.05 SPRAY NASAL at 21:22

## 2023-03-05 ASSESSMENT — LIFESTYLE VARIABLES
HOW OFTEN DO YOU HAVE A DRINK CONTAINING ALCOHOL: NEVER
HOW MANY STANDARD DRINKS CONTAINING ALCOHOL DO YOU HAVE ON A TYPICAL DAY: PATIENT DOES NOT DRINK

## 2023-03-09 ENCOUNTER — OFFICE VISIT (OUTPATIENT)
Dept: ENT CLINIC | Age: 58
End: 2023-03-09
Payer: COMMERCIAL

## 2023-03-09 VITALS
HEART RATE: 97 BPM | DIASTOLIC BLOOD PRESSURE: 86 MMHG | SYSTOLIC BLOOD PRESSURE: 129 MMHG | HEIGHT: 71 IN | BODY MASS INDEX: 23.52 KG/M2 | TEMPERATURE: 98 F | WEIGHT: 168 LBS | OXYGEN SATURATION: 98 %

## 2023-03-09 DIAGNOSIS — R04.0 EPISTAXIS: Primary | ICD-10-CM

## 2023-03-09 DIAGNOSIS — J34.2 NASAL SEPTAL DEVIATION: ICD-10-CM

## 2023-03-09 DIAGNOSIS — R55 VASOVAGAL REACTION: ICD-10-CM

## 2023-03-09 PROCEDURE — 99215 OFFICE O/P EST HI 40 MIN: CPT | Performed by: OTOLARYNGOLOGY

## 2023-03-09 PROCEDURE — 3074F SYST BP LT 130 MM HG: CPT | Performed by: OTOLARYNGOLOGY

## 2023-03-09 PROCEDURE — 3078F DIAST BP <80 MM HG: CPT | Performed by: OTOLARYNGOLOGY

## 2023-03-09 NOTE — PATIENT INSTRUCTIONS
NASAL CARE FOR NOSEBLEED       Do not pull crusts out of nose, refrain from \"nose-picking. \"    You may blow your nose gently, but do not move your nose around when blowing. Over the next two weeks, do the following:     Use a 12 hour decongestant spray, such as 0.05% oxymetazoline nasal spray (eg. Afrin). This is available \"over the counter\" at your pharmacy. [place] two sprays in each nostril three times a day for three days, then two times a day for 2 days, and then stop for two days, and then repeat the cycle once. Use a saline (salt water) nasal spray/mist, (eg. Ocean nasal saline mist, AYR nasal spray). This is available \"over the counter\" at your pharmacy. [place] two sprays in each nostril every two to three hours while you are awake, for two weeks. Use a bedside humidifier, at night, while sleeping. Use polysporin ointment in each nostril at bedtime. This is available \"over the counter\" at your pharmacy. [place] the ointment onto the lateral wall of each nostril, gently, with a Q-tip applicator, then gently pinch and rub the nostrils as instructed. (Do not apply directly to the septum middle wall of the nose)    If bleeding occurs, pinch your nostrils together in the soft part of the nose and push gently toward your face, and hold for ten minutes. If bleeding persists, then repeat. If bleeding still persists, dampen a cotton ball with Afrin and insert into your nostril and repeat the previous pinch maneuver. If nose bleed remains uncontrolled, go to emergency room. For future prevention of nose bleeds, use a saline (salt water) nasal spray/mist, (eg. Ocean nasal saline mist, AYR nasal spray). This is available \"over the counter\" at your pharmacy. [place] two sprays in each nostril two to three times daily, while you are awake.

## 2023-03-09 NOTE — PROGRESS NOTES
[facility]               PCP:  Prosper Blutn MD      CHIEF COMPLAINT  Chief Complaint   Patient presents with    Epistaxis        HISTORY OF PRESENT ILLNESS    Feli Kruger is a 65 y.o. male who presented today for recheck and follow up of epistaxis. He had a recurrent nosebleed again on this past Sunday morning and went to the [facility] ER at 9 PM.  A Rapid Rhino epistat was inserted. He stated he has had \"a little bleeding\" since he was sent home but none today. PAST MEDICAL HISTORY    Past Medical History:   Diagnosis Date    Essential hypertension 6/17/2016       Past Surgical History:   Procedure Laterality Date    HERNIA REPAIR  1982         EXAMINATION    Vitals:    03/09/23 1339 03/09/23 1344 03/09/23 1352 03/09/23 1400   BP: 89/61 108/70 122/83 129/86   Site: Right Upper Arm Right Upper Arm Right Upper Arm Right Upper Arm   Position: Supine Supine Supine Supine   Cuff Size: Medium Adult Medium Adult Medium Adult    Pulse: 74  99 97   Temp:       TempSrc:       SpO2: 93% 91% 98% 98%   Weight:       Height:         General:  WDWN, NAD, alert and oriented  Face: There was no swelling or lesions detected. Voice:  Normal with no hoarseness or hot potato voice. Nose:  Rapid Rhino was present in the left nasal cavity with no evidence of active bleeding. Nasal septum was deviated to the left. There were no intranasal lesions detected. The external nose, turbinates, secretions, and mucosa appeared to be normal.   Oral cavity:  Mucosa, secretions, tongue, and gingiva appeared to be normal.   Oropharynx: Normal with no evidence of active bleeding on the posterior oropharyngeal wall. No lesions detected. The rapid Rhino Epistat was deflated with no bleeding and then removed again with no bleeding. Several minutes later the patient had a vasovagal reaction with body shaking and loss of consciousness.   He was immediately sat back in the chair as he had been leaning forward in the chair. He did not fall from the chair. The chair was laid back into a semilying position. Patient stated that he felt better. However after approximate 1 minute he had a second vasovagal reaction again with body shaking and seizure-like activity and loss of consciousness. This lasted about 1 minute and he was then speaking and stating that he felt weird. Help was called and staff came to assist after that at this point. Vital signs were taken and were followed throughout the rest of the visit. Blood pressure was initially low with a normal heart rate and low pulse oximetry in the 86-91 range. After a few minutes pulse oximetry was registering in the 77-80 range but the patient's hands were cold and it was felt that this was due to vasoconstriction and fingers. Patient was then covered with his coat and his hands were covered and subsequent pulse oximetry was 96%. Blood pressure recovered to normal range as well. At this point patient stated he felt better, good, and back to normal and felt he was ready to leave and go home. Mary Mohamud / Carmen Loera / Clarisa Edmondson was seen today for epistaxis. Diagnoses and all orders for this visit:    Epistaxis    Nasal septal deviation    Vasovagal reaction         RECOMMENDATIONS/PLAN      Patient was advised to repeat all of the instructions for care of nosebleed given at the last visit   Patient was advised to keep his appointment on 4/5/2023 that was scheduled after the last visit. Return in 27 days (on 4/5/2023) for recheck/follow-up, and sooner if condition worsens. Patient Instructions   NASAL CARE FOR NOSEBLEED       Do not pull crusts out of nose, refrain from \"nose-picking. \"    You may blow your nose gently, but do not move your nose around when blowing.       Over the next two weeks, do the following:     Use a 12 hour decongestant spray, such as 0.05% oxymetazoline nasal spray (eg. Afrin). This is available \"over the counter\" at your pharmacy. [place] two sprays in each nostril three times a day for three days, then two times a day for 2 days, and then stop for two days, and then repeat the cycle once. Use a saline (salt water) nasal spray/mist, (eg. Ocean nasal saline mist, AYR nasal spray). This is available \"over the counter\" at your pharmacy. [place] two sprays in each nostril every two to three hours while you are awake, for two weeks. Use a bedside humidifier, at night, while sleeping. Use polysporin ointment in each nostril at bedtime. This is available \"over the counter\" at your pharmacy. [place] the ointment onto the lateral wall of each nostril, gently, with a Q-tip applicator, then gently pinch and rub the nostrils as instructed. (Do not apply directly to the septum middle wall of the nose)    If bleeding occurs, pinch your nostrils together in the soft part of the nose and push gently toward your face, and hold for ten minutes. If bleeding persists, then repeat. If bleeding still persists, dampen a cotton ball with Afrin and insert into your nostril and repeat the previous pinch maneuver. If nose bleed remains uncontrolled, go to emergency room. For future prevention of nose bleeds, use a saline (salt water) nasal spray/mist, (eg. Ocean nasal saline mist, AYR nasal spray). This is available \"over the counter\" at your pharmacy. [place] two sprays in each nostril two to three times daily, while you are awake.

## 2023-03-11 NOTE — ED PROVIDER NOTES
[facility] [facility]        Pt Name: Abbie Looney  MRN: [id number]  [place] 1965  Date of evaluation: 3/5/2023  Provider: NEREIDA Carias  PCP: Tena Begum MD  Note Started: 3:56 PM EST       HENNY. I have evaluated this patient. My supervising physician was available for consultation. CHIEF COMPLAINT       Chief Complaint   Patient presents with    Epistaxis     Pt states that his nose has been bleeding for since today at 1430 on and off. Pt states that he saw ENT Friday. Pt states not on blood thinners. HISTORY OF PRESENT ILLNESS      Chief Complaint: Epistaxis    Abbie Looney is a 65 y.o. male who presents with epistaxis. Started this afternoon. Seen in the emergency department for same thing last week, had Rhino Rocket placed. Was going to follow-up with ENT, but he accidentally ripped the Rhino Rocket out of his nose, and then did not feel he needed to follow-up with ENT. He denies fevers, chills. No nausea or vomiting. No headache or sore throat. SCREENINGS    César Coma Scale  Eye Opening: Spontaneous  Best Verbal Response: Oriented  Best Motor Response: Obeys commands  César Coma Scale Score: 15      Is this patient to be included in the SEP-1 Core Measure due to severe sepsis or septic shock? No   Exclusion criteria - the patient is NOT to be included for SEP-1 Core Measure due to: Infection is not suspected      PHYSICAL EXAM     Vitals: BP (!) 142/92   Pulse (!) 102   Temp 97.6 °F (36.4 °C)   Resp 16   Ht 5' 11\" (1.803 m)   Wt 174 lb (78.9 kg)   SpO2 98%   BMI 24.27 kg/m²    General: awake, alert, no apparent distress  Pupils: equal, reactive  Head: Left nostril bleeding. Heart: Rate as noted, regular rhythm, no murmur or rubs.   Chest/Lungs: CTAB, no wheezes or crackles  Abdomen: soft, nondistended, no tenderness to palpation   Extremities:  cap refill <2 UE/LE, no tenderness of calves, no edema  Neuro: no facial droop, no slurred speech, answers questions appropriately. Skin: Warm. No visible rash, lesions, or bruising       DIAGNOSTIC RESULTS   LABS:    Labs Reviewed - No data to display    EKG: When ordered, EKG's are interpreted by the Emergency Department Physician in the absence of a cardiologist.  Please see their note for interpretation of EKG. RADIOLOGY:   No orders to display     No results found. No results found. PROCEDURES   Epistaxis Mgmt    Date/Time: 3/11/2023 3:57 PM  Performed by: NEREIDA Abdullahi  Authorized by: NEREIDA Abdullahi     Consent:     Consent obtained:  Verbal    Consent given by:  Patient    Risks, benefits, and alternatives were discussed: yes      Risks discussed:  Pain and nasal injury    Alternatives discussed:  No treatment  Universal protocol:     Procedure explained and questions answered to patient or proxy's satisfaction: yes      Patient identity confirmed:  Verbally with patient  Anesthesia:     Anesthesia method:  None  Procedure details:     Treatment site:  L anterior    Treatment method:  Nasal balloon    Treatment complexity:  Limited    Treatment episode: recurring    Post-procedure details:     Assessment:  Bleeding stopped    Procedure completion:  Tolerated      CRITICAL CARE TIME   I personally saw the patient and independently provided 0 minutes of non-concurrent critical care time out of the total critical care time provided. This excludes time spent doing separately billable procedures. This includes time at the bedside, data interpretation, medication management, obtaining critical history from collateral sources if the patient is unable to provide it directly, and physician consultation. Specifics of interventions taken and potentially life-threatening diagnostic considerations are listed above in the medical decision making.     CONSULTS   None    ED COURSE and MEDICAL DECISIONS MAKING:   Vitals:    Vitals: 03/05/23 2037 03/05/23 2039 03/05/23 2128 03/05/23 2155   BP: (!) 142/92      Pulse: (!) 112   (!) 102   Resp: 16      Temp:  97.6 °F (36.4 °C)     TempSrc: Oral      SpO2: 98%      Weight:   174 lb (78.9 kg)    Height:   5' 11\" (1.803 m)      MEDICATIONS:  Medications   oxymetazoline (AFRIN) 0.05 % nasal spray 2 spray (2 sprays Each Nostril Given 3/5/23 2122)   tranexamic acid (CYKLOKAPRON) injection 100 mg (100 mg Nasal Given 3/5/23 2122)   ondansetron (ZOFRAN-ODT) disintegrating tablet 4 mg (4 mg Oral Given 3/5/23 2121)           60-year-old male presents with epistaxis. Bleeding resolved with Afrin nasal spray, TXA and Rhino Rocket nasal balloon. Given Zofran due to nausea related to swallowing blood. Patient has an ENT, from his previous round of epistaxis. I recommended he follow-up with provider next week for reexamination, and balloon removal.  Patient understands and will return to the ED should symptoms change or worsen. FINAL IMPRESSION      1. Epistaxis          DISPOSITION/PLAN   DISPOSITION Decision To Discharge 03/05/2023 09:54:45 PM      PATIENT REFERRED TO:  No follow-up provider specified.     DISCHARGE MEDICATIONS:  Discharge Medication List as of 3/5/2023  9:58 PM          NEREIDA Caballero (electronically signed)        NEREIDA Fung  03/11/23 1600

## 2023-03-13 ENCOUNTER — OFFICE VISIT (OUTPATIENT)
Dept: FAMILY MEDICINE CLINIC | Age: 58
End: 2023-03-13
Payer: COMMERCIAL

## 2023-03-13 VITALS
SYSTOLIC BLOOD PRESSURE: 180 MMHG | HEIGHT: 71 IN | WEIGHT: 173 LBS | OXYGEN SATURATION: 98 % | BODY MASS INDEX: 24.22 KG/M2 | DIASTOLIC BLOOD PRESSURE: 102 MMHG | HEART RATE: 126 BPM

## 2023-03-13 DIAGNOSIS — R04.0 EPISTAXIS: ICD-10-CM

## 2023-03-13 DIAGNOSIS — I10 ESSENTIAL HYPERTENSION: ICD-10-CM

## 2023-03-13 DIAGNOSIS — Z09 HOSPITAL DISCHARGE FOLLOW-UP: Primary | ICD-10-CM

## 2023-03-13 DIAGNOSIS — R00.0 TACHYCARDIA: ICD-10-CM

## 2023-03-13 DIAGNOSIS — Z72.0 TOBACCO ABUSE: ICD-10-CM

## 2023-03-13 PROCEDURE — 3080F DIAST BP >= 90 MM HG: CPT | Performed by: FAMILY MEDICINE

## 2023-03-13 PROCEDURE — 3077F SYST BP >= 140 MM HG: CPT | Performed by: FAMILY MEDICINE

## 2023-03-13 PROCEDURE — 99214 OFFICE O/P EST MOD 30 MIN: CPT | Performed by: FAMILY MEDICINE

## 2023-03-13 RX ORDER — VARENICLINE TARTRATE 1 MG/1
1 TABLET, FILM COATED ORAL 2 TIMES DAILY
Qty: 60 TABLET | Refills: 5 | Status: SHIPPED | OUTPATIENT
Start: 2023-03-13

## 2023-03-13 SDOH — ECONOMIC STABILITY: INCOME INSECURITY: HOW HARD IS IT FOR YOU TO PAY FOR THE VERY BASICS LIKE FOOD, HOUSING, MEDICAL CARE, AND HEATING?: NOT HARD AT ALL

## 2023-03-13 SDOH — ECONOMIC STABILITY: FOOD INSECURITY: WITHIN THE PAST 12 MONTHS, THE FOOD YOU BOUGHT JUST DIDN'T LAST AND YOU DIDN'T HAVE MONEY TO GET MORE.: NEVER TRUE

## 2023-03-13 SDOH — ECONOMIC STABILITY: HOUSING INSECURITY
IN THE LAST 12 MONTHS, WAS THERE A TIME WHEN YOU DID NOT HAVE A STEADY PLACE TO SLEEP OR SLEPT IN A SHELTER (INCLUDING NOW)?: NO

## 2023-03-13 SDOH — ECONOMIC STABILITY: FOOD INSECURITY: WITHIN THE PAST 12 MONTHS, YOU WORRIED THAT YOUR FOOD WOULD RUN OUT BEFORE YOU GOT MONEY TO BUY MORE.: NEVER TRUE

## 2023-03-13 ASSESSMENT — ENCOUNTER SYMPTOMS
RESPIRATORY NEGATIVE: 1
GASTROINTESTINAL NEGATIVE: 1

## 2023-03-13 ASSESSMENT — PATIENT HEALTH QUESTIONNAIRE - PHQ9
SUM OF ALL RESPONSES TO PHQ QUESTIONS 1-9: 0
2. FEELING DOWN, DEPRESSED OR HOPELESS: 0
SUM OF ALL RESPONSES TO PHQ QUESTIONS 1-9: 0
1. LITTLE INTEREST OR PLEASURE IN DOING THINGS: 0
SUM OF ALL RESPONSES TO PHQ9 QUESTIONS 1 & 2: 0

## 2023-03-13 NOTE — PROGRESS NOTES
Tanisha Angel (:  1965) is a 65 y.o. male,Established patient, here for evaluation of the following chief complaint(s):  Hypertension (Blood pressure medication) and Follow-up (Ed follow up, nose bleeds )         ASSESSMENT/PLAN:t:    Hospital discharge follow-up    Essential hypertension  Patient will continue his current medications and monitoring his blood pressure. See discussion below regarding beta-blocker  Epistaxis  Patient will follow-up with Dr. Ruthie Scott  Tobacco abuse  Add  -     varenicline (CHANTIX) 1 MG tablet; Take 1 tablet by mouth 2 times daily  Tachycardia  See below discussion regarding beta-blocker. Patient is asymptomatic         Return in about 1 month (around 2023) for htn. Subjective   SUBJECTIVE/OBJECTIVE:  HPI  Patient has been in the ER approximately 10 days ago and 8 days ago for epistaxis. He had a balloon x2. He has seen Dr. Ruthie Scott. Prior to that he had not seen a doctor for a long time. He was started on blood pressure medication in the ER on 3/1 specifically valsartan HCT and amlodipine. He has taken that for approximately 10 days. He states he did forget his blood pressure medication today but it has been better since he started the medication. He states he dropped out of medical care approximately 2 to 3 years ago because he got tired of seeing doctors and also because of [place]. He would like to stop smoking stating he restarted in 2020. He has had Chantix before and would like to try that again. He states it did work. He states other than wild dreams he did not have any depression related to that. After his exam he was questioned about his tachycardia and states he always has a fast heart rate. We discussed if his blood pressure is not controlled consideration of a beta-blocker to slow his pulse rate as well as lower his blood pressure might be entertained. Review of Systems   Constitutional: Negative.     HENT:  Positive for nosebleeds and tinnitus. Respiratory: Negative. Cardiovascular: Negative. Gastrointestinal: Negative. Endocrine: Negative. Genitourinary: Negative. Musculoskeletal: Negative. Neurological: Negative. Negative for dizziness, light-headedness and headaches. Psychiatric/Behavioral: Negative. Objective   Physical Exam  Constitutional:       General: He is not in acute distress. Appearance: He is not ill-appearing, toxic-appearing or diaphoretic. HENT:      Head: Normocephalic and atraumatic. Eyes:      Conjunctiva/sclera: Conjunctivae normal.   Neck:      Thyroid: No thyromegaly. Vascular: No carotid bruit. Cardiovascular:      Rate and Rhythm: Regular rhythm. Tachycardia present. Pulmonary:      Effort: Pulmonary effort is normal.      Breath sounds: Normal breath sounds. No wheezing or rales. Musculoskeletal:      Cervical back: Neck supple. Lymphadenopathy:      Cervical: No cervical adenopathy. Skin:     General: Skin is warm and dry. Neurological:      Mental Status: He is alert and oriented to person, place, and time. Psychiatric:         Mood and Affect: Mood normal.         Behavior: Behavior normal.         Thought Content: Thought content normal.         Judgment: Judgment normal.                An electronic signature was used to authenticate this note.     --Trupti Najera MD

## 2023-03-31 DIAGNOSIS — E87.6 HYPOKALEMIA: ICD-10-CM

## 2023-03-31 DIAGNOSIS — I10 ESSENTIAL HYPERTENSION: Primary | ICD-10-CM

## 2023-03-31 DIAGNOSIS — N40.1 BENIGN PROSTATIC HYPERPLASIA WITH LOWER URINARY TRACT SYMPTOMS, SYMPTOM DETAILS UNSPECIFIED: ICD-10-CM

## 2023-03-31 RX ORDER — TAMSULOSIN HYDROCHLORIDE 0.4 MG/1
0.4 CAPSULE ORAL DAILY
Qty: 5 CAPSULE | Refills: 2 | Status: SHIPPED | OUTPATIENT
Start: 2023-03-31

## 2023-03-31 RX ORDER — VALSARTAN AND HYDROCHLOROTHIAZIDE 320; 12.5 MG/1; MG/1
1 TABLET, FILM COATED ORAL DAILY
Qty: 30 TABLET | Refills: 2 | Status: SHIPPED | OUTPATIENT
Start: 2023-03-31

## 2023-03-31 RX ORDER — POTASSIUM CHLORIDE 750 MG/1
10 TABLET, EXTENDED RELEASE ORAL DAILY
Qty: 30 TABLET | Refills: 2 | Status: SHIPPED | OUTPATIENT
Start: 2023-03-31

## 2023-03-31 RX ORDER — AMLODIPINE BESYLATE 10 MG/1
10 TABLET ORAL DAILY
Qty: 30 TABLET | Refills: 2 | Status: SHIPPED | OUTPATIENT
Start: 2023-03-31

## 2023-04-05 ENCOUNTER — OFFICE VISIT (OUTPATIENT)
Dept: ENT CLINIC | Age: 58
End: 2023-04-05
Payer: COMMERCIAL

## 2023-04-05 VITALS
OXYGEN SATURATION: 95 % | HEIGHT: 71 IN | HEART RATE: 134 BPM | TEMPERATURE: 97.9 F | WEIGHT: 178 LBS | SYSTOLIC BLOOD PRESSURE: 123 MMHG | BODY MASS INDEX: 24.92 KG/M2 | DIASTOLIC BLOOD PRESSURE: 86 MMHG

## 2023-04-05 DIAGNOSIS — H93.13 SUBJECTIVE TINNITUS OF BOTH EARS: ICD-10-CM

## 2023-04-05 DIAGNOSIS — J34.2 NASAL SEPTAL DEVIATION: ICD-10-CM

## 2023-04-05 DIAGNOSIS — R04.0 EPISTAXIS: Primary | ICD-10-CM

## 2023-04-05 PROCEDURE — 3074F SYST BP LT 130 MM HG: CPT | Performed by: OTOLARYNGOLOGY

## 2023-04-05 PROCEDURE — 3078F DIAST BP <80 MM HG: CPT | Performed by: OTOLARYNGOLOGY

## 2023-04-05 PROCEDURE — 99213 OFFICE O/P EST LOW 20 MIN: CPT | Performed by: OTOLARYNGOLOGY

## 2023-04-11 DIAGNOSIS — N40.1 BENIGN PROSTATIC HYPERPLASIA WITH LOWER URINARY TRACT SYMPTOMS, SYMPTOM DETAILS UNSPECIFIED: ICD-10-CM

## 2023-04-11 RX ORDER — TAMSULOSIN HYDROCHLORIDE 0.4 MG/1
CAPSULE ORAL
Qty: 30 CAPSULE | Refills: 2 | Status: SHIPPED | OUTPATIENT
Start: 2023-04-11 | End: 2023-05-04

## 2023-04-24 ENCOUNTER — OFFICE VISIT (OUTPATIENT)
Dept: FAMILY MEDICINE CLINIC | Age: 58
End: 2023-04-24
Payer: COMMERCIAL

## 2023-04-24 VITALS
HEART RATE: 130 BPM | DIASTOLIC BLOOD PRESSURE: 80 MMHG | HEIGHT: 71 IN | WEIGHT: 178 LBS | BODY MASS INDEX: 24.92 KG/M2 | SYSTOLIC BLOOD PRESSURE: 150 MMHG | OXYGEN SATURATION: 98 %

## 2023-04-24 DIAGNOSIS — R00.0 TACHYCARDIA: ICD-10-CM

## 2023-04-24 DIAGNOSIS — I10 ESSENTIAL HYPERTENSION: Primary | ICD-10-CM

## 2023-04-24 PROCEDURE — 3079F DIAST BP 80-89 MM HG: CPT | Performed by: FAMILY MEDICINE

## 2023-04-24 PROCEDURE — 93000 ELECTROCARDIOGRAM COMPLETE: CPT | Performed by: FAMILY MEDICINE

## 2023-04-24 PROCEDURE — 3077F SYST BP >= 140 MM HG: CPT | Performed by: FAMILY MEDICINE

## 2023-04-24 PROCEDURE — 99214 OFFICE O/P EST MOD 30 MIN: CPT | Performed by: FAMILY MEDICINE

## 2023-04-24 NOTE — PROGRESS NOTES
Subjective:   Dasia Horton is a 65 y.o. male with hypertension. Hypertension ROS: taking medications as instructed, no medication side effects noted, no TIA's, no chest pain at rest or on exertion, no SOB or dyspnea on exertion, no swelling of ankles or feet. Exercise:no formal exercise   New concerns: When questioned about his heart rate he states he was feeling somewhat nervous today and can tell his heart is racing. Home BP: 120's/80's    Outpatient Medications Marked as Taking for the 4/24/23 encounter (Office Visit) with Dre Pena MD   Medication Sig Dispense Refill    tamsulosin (FLOMAX) 0.4 MG capsule TAKE 1 CAPSULE BY MOUTH EVERY DAY 30 capsule 2    amLODIPine (NORVASC) 10 MG tablet Take 1 tablet by mouth daily 30 tablet 2    valsartan-hydroCHLOROthiazide (DIOVAN-HCT) 320-12.5 MG per tablet Take 1 tablet by mouth daily 30 tablet 2    potassium chloride (KLOR-CON M) 10 MEQ extended release tablet Take 1 tablet by mouth daily 30 tablet 2    varenicline (CHANTIX) 1 MG tablet Take 1 tablet by mouth 2 times daily 60 tablet 5     No Known Allergies    Objective:   BP (!) 150/80   Pulse (!) 130   Ht 5' 11\" (1.803 m)   Wt 178 lb (80.7 kg)   SpO2 98%   BMI 24.83 kg/m²    BP: my cuff  150/80                         patient cuff:  the patient did not bring his cuff    Appearance alert and oriented and in no distress. Skin: warm and dry  Eyes: PERRL  Neck: No JVD, or bruits. No adenopathy or thyromegaly  Lungs: Chest is clear; no wheezes or rales. Heart: S1 and S2 normal, no murmurs, clicks, gallops or rubs. Regular rate and rhythm. Ext[de-identified] No edema  Lab review: 3/1/2023    Assessment/Plan:    Ruth Schmid was seen today for 3 month follow-up. Diagnoses and all orders for this visit:    Essential hypertension  Somewhat increased today obesity has been normal.  Continue current treatment. Return 1 month. Bring blood pressure cuff and diary.   Consider metoprolol if tachycardia continues to be an

## 2023-05-04 DIAGNOSIS — N40.1 BENIGN PROSTATIC HYPERPLASIA WITH LOWER URINARY TRACT SYMPTOMS, SYMPTOM DETAILS UNSPECIFIED: ICD-10-CM

## 2023-05-04 RX ORDER — TAMSULOSIN HYDROCHLORIDE 0.4 MG/1
CAPSULE ORAL
Qty: 30 CAPSULE | Refills: 5 | Status: SHIPPED | OUTPATIENT
Start: 2023-05-04

## 2023-05-04 NOTE — TELEPHONE ENCOUNTER
Medication:   Requested Prescriptions     Pending Prescriptions Disp Refills    tamsulosin (FLOMAX) 0.4 MG capsule [Pharmacy Med Name: TAMSULOSIN HCL 0.4 MG CAPSULE] 30 capsule 2     Sig: TAKE 1 CAPSULE BY MOUTH EVERY DAY        Last Filled:  4/11/2023, 30, 2    Patient Phone Number: [phone] (home) [phone] (work)    Last appt: 4/24/2023   Next appt: 5/25/2023    Last OARRS: No flowsheet data found.

## 2023-05-25 ENCOUNTER — OFFICE VISIT (OUTPATIENT)
Dept: FAMILY MEDICINE CLINIC | Age: 58
End: 2023-05-25
Payer: COMMERCIAL

## 2023-05-25 VITALS
BODY MASS INDEX: 24.64 KG/M2 | WEIGHT: 176 LBS | DIASTOLIC BLOOD PRESSURE: 86 MMHG | HEIGHT: 71 IN | OXYGEN SATURATION: 98 % | SYSTOLIC BLOOD PRESSURE: 138 MMHG | HEART RATE: 127 BPM

## 2023-05-25 DIAGNOSIS — I10 ESSENTIAL HYPERTENSION: Primary | ICD-10-CM

## 2023-05-25 DIAGNOSIS — R00.0 TACHYCARDIA: ICD-10-CM

## 2023-05-25 PROCEDURE — 99213 OFFICE O/P EST LOW 20 MIN: CPT | Performed by: FAMILY MEDICINE

## 2023-05-25 PROCEDURE — 3079F DIAST BP 80-89 MM HG: CPT | Performed by: FAMILY MEDICINE

## 2023-05-25 PROCEDURE — 3075F SYST BP GE 130 - 139MM HG: CPT | Performed by: FAMILY MEDICINE

## 2023-05-25 RX ORDER — METOPROLOL SUCCINATE 25 MG/1
25 TABLET, EXTENDED RELEASE ORAL DAILY
Qty: 90 TABLET | Refills: 1 | Status: SHIPPED | OUTPATIENT
Start: 2023-05-25

## 2023-05-25 ASSESSMENT — ENCOUNTER SYMPTOMS
RESPIRATORY NEGATIVE: 1
GASTROINTESTINAL NEGATIVE: 1

## 2023-05-25 NOTE — PROGRESS NOTES
supple. Lymphadenopathy:      Cervical: No cervical adenopathy. Skin:     General: Skin is warm and dry. Neurological:      Mental Status: He is alert and oriented to person, place, and time. Psychiatric:         Mood and Affect: Mood normal.         Behavior: Behavior normal.         Thought Content: Thought content normal.         Judgment: Judgment normal.                An electronic signature was used to authenticate this note.     --Liya Lerner MD

## 2023-06-21 DIAGNOSIS — E87.6 HYPOKALEMIA: ICD-10-CM

## 2023-06-21 DIAGNOSIS — I10 ESSENTIAL HYPERTENSION: ICD-10-CM

## 2023-06-21 RX ORDER — POTASSIUM CHLORIDE 750 MG/1
TABLET, EXTENDED RELEASE ORAL
Qty: 90 TABLET | Refills: 0 | Status: SHIPPED | OUTPATIENT
Start: 2023-06-21

## 2023-06-21 RX ORDER — VALSARTAN AND HYDROCHLOROTHIAZIDE 320; 12.5 MG/1; MG/1
TABLET, FILM COATED ORAL
Qty: 90 TABLET | Refills: 0 | Status: SHIPPED | OUTPATIENT
Start: 2023-06-21

## 2023-06-21 RX ORDER — AMLODIPINE BESYLATE 10 MG/1
TABLET ORAL
Qty: 90 TABLET | Refills: 0 | Status: SHIPPED | OUTPATIENT
Start: 2023-06-21

## 2023-06-21 NOTE — TELEPHONE ENCOUNTER
Medication:   Requested Prescriptions     Pending Prescriptions Disp Refills    amLODIPine (NORVASC) 10 MG tablet [Pharmacy Med Name: AMLODIPINE BESYLATE 10 MG TAB] 90 tablet      Sig: TAKE 1 TABLET BY MOUTH EVERY DAY    valsartan-hydroCHLOROthiazide (DIOVAN-HCT) 320-12.5 MG per tablet [Pharmacy Med Name: Lethia Estrin 320-12.5 MG TAB] 90 tablet      Sig: TAKE 1 TABLET BY MOUTH EVERY DAY    KLOR-CON M10 10 MEQ extended release tablet [Pharmacy Med Name: KLOR-CON M10 TABLET] 90 tablet      Sig: TAKE 1 TABLET BY MOUTH EVERY DAY       Last Filled:  3/31/2023    Patient Phone Number: [phone] (home) [phone] (work)    Last appt: 5/25/2023   Next appt: 8/28/2023    Lab Results   Component Value Date     03/01/2023    K 3.5 03/01/2023     03/01/2023    CO2 24 03/01/2023    BUN 20 03/01/2023    CREATININE 0.8 (L) 03/01/2023    GLUCOSE 130 (H) 03/01/2023    CALCIUM 9.0 03/01/2023    PROT 7.4 03/01/2023    LABALBU 4.2 03/01/2023    BILITOT 1.0 03/01/2023    ALKPHOS 76 03/01/2023    AST 25 03/01/2023    ALT 28 03/01/2023    LABGLOM >60 03/01/2023    GFRAA >60 08/01/2018    AGRATIO 1.3 03/01/2023    GLOB 2.5 08/01/2018

## 2023-08-28 ENCOUNTER — OFFICE VISIT (OUTPATIENT)
Dept: FAMILY MEDICINE CLINIC | Age: 58
End: 2023-08-28

## 2023-08-28 VITALS
HEART RATE: 123 BPM | WEIGHT: 178.4 LBS | DIASTOLIC BLOOD PRESSURE: 86 MMHG | SYSTOLIC BLOOD PRESSURE: 138 MMHG | BODY MASS INDEX: 24.88 KG/M2 | OXYGEN SATURATION: 98 %

## 2023-08-28 DIAGNOSIS — Z72.0 TOBACCO ABUSE: ICD-10-CM

## 2023-08-28 DIAGNOSIS — R00.0 TACHYCARDIA: ICD-10-CM

## 2023-08-28 DIAGNOSIS — I10 ESSENTIAL HYPERTENSION: Primary | ICD-10-CM

## 2023-08-28 PROCEDURE — 3075F SYST BP GE 130 - 139MM HG: CPT | Performed by: FAMILY MEDICINE

## 2023-08-28 PROCEDURE — 99213 OFFICE O/P EST LOW 20 MIN: CPT | Performed by: FAMILY MEDICINE

## 2023-08-28 PROCEDURE — 3079F DIAST BP 80-89 MM HG: CPT | Performed by: FAMILY MEDICINE

## 2023-08-28 ASSESSMENT — PATIENT HEALTH QUESTIONNAIRE - PHQ9
1. LITTLE INTEREST OR PLEASURE IN DOING THINGS: 1
SUM OF ALL RESPONSES TO PHQ QUESTIONS 1-9: 2
2. FEELING DOWN, DEPRESSED OR HOPELESS: 1
SUM OF ALL RESPONSES TO PHQ9 QUESTIONS 1 & 2: 2
SUM OF ALL RESPONSES TO PHQ QUESTIONS 1-9: 2

## 2023-08-28 NOTE — PROGRESS NOTES
visit:    Essential hypertension  [facility] office hypertension. Continue current treatment. Home blood pressure checks. Return 2 months. Bring cuff. Tachycardia  [facility] office tachycardia. As noted above patient was started on metoprolol.   Continue current treatment and monitoring of pulse  Tobacco abuse  Smoking cessation discussed with the patient

## 2023-09-18 DIAGNOSIS — E87.6 HYPOKALEMIA: ICD-10-CM

## 2023-09-18 RX ORDER — POTASSIUM CHLORIDE 750 MG/1
TABLET, EXTENDED RELEASE ORAL
Qty: 90 TABLET | Refills: 2 | Status: SHIPPED | OUTPATIENT
Start: 2023-09-18

## 2023-10-24 DIAGNOSIS — N40.1 BENIGN PROSTATIC HYPERPLASIA WITH LOWER URINARY TRACT SYMPTOMS, SYMPTOM DETAILS UNSPECIFIED: ICD-10-CM

## 2023-10-24 RX ORDER — TAMSULOSIN HYDROCHLORIDE 0.4 MG/1
CAPSULE ORAL
Qty: 90 CAPSULE | Refills: 1 | Status: SHIPPED | OUTPATIENT
Start: 2023-10-24

## 2023-11-16 DIAGNOSIS — R00.0 TACHYCARDIA: ICD-10-CM

## 2023-11-16 DIAGNOSIS — I10 ESSENTIAL HYPERTENSION: ICD-10-CM

## 2023-11-16 RX ORDER — METOPROLOL SUCCINATE 25 MG/1
25 TABLET, EXTENDED RELEASE ORAL DAILY
Qty: 90 TABLET | Refills: 1 | Status: SHIPPED | OUTPATIENT
Start: 2023-11-16

## 2024-02-12 DIAGNOSIS — R00.0 TACHYCARDIA: ICD-10-CM

## 2024-02-12 DIAGNOSIS — I10 ESSENTIAL HYPERTENSION: ICD-10-CM

## 2024-02-12 RX ORDER — METOPROLOL SUCCINATE 25 MG/1
25 TABLET, EXTENDED RELEASE ORAL DAILY
Qty: 90 TABLET | Refills: 1 | Status: SHIPPED | OUTPATIENT
Start: 2024-02-12

## 2024-02-12 NOTE — TELEPHONE ENCOUNTER
Medication:   Requested Prescriptions     Pending Prescriptions Disp Refills    metoprolol succinate (TOPROL XL) 25 MG extended release tablet [Pharmacy Med Name: METOPROLOL SUCC ER 25 MG TAB] 90 tablet 1     Sig: TAKE 1 TABLET BY MOUTH EVERY DAY       Last Filled:      Patient Phone Number: [phone] (home)     Last appt: 8/28/2023   Next appt: Visit date not found    Lab Results   Component Value Date     03/01/2023    K 3.5 03/01/2023     03/01/2023    CO2 24 03/01/2023    BUN 20 03/01/2023    CREATININE 0.8 (L) 03/01/2023    GLUCOSE 130 (H) 03/01/2023    CALCIUM 9.0 03/01/2023    PROT 7.4 03/01/2023    LABALBU 4.2 03/01/2023    BILITOT 1.0 03/01/2023    ALKPHOS 76 03/01/2023    AST 25 03/01/2023    ALT 28 03/01/2023    LABGLOM >60 03/01/2023    GFRAA >60 08/01/2018    AGRATIO 1.3 03/01/2023    GLOB 2.5 08/01/2018

## 2024-04-07 DIAGNOSIS — E87.6 HYPOKALEMIA: ICD-10-CM

## 2024-04-08 RX ORDER — POTASSIUM CHLORIDE 750 MG/1
TABLET, EXTENDED RELEASE ORAL
Qty: 90 TABLET | Refills: 1 | Status: SHIPPED | OUTPATIENT
Start: 2024-04-08

## 2024-08-11 DIAGNOSIS — R00.0 TACHYCARDIA: ICD-10-CM

## 2024-08-11 DIAGNOSIS — I10 ESSENTIAL HYPERTENSION: ICD-10-CM

## 2024-08-12 RX ORDER — METOPROLOL SUCCINATE 25 MG/1
25 TABLET, EXTENDED RELEASE ORAL DAILY
Qty: 90 TABLET | Refills: 0 | Status: SHIPPED | OUTPATIENT
Start: 2024-08-12

## 2024-08-12 NOTE — TELEPHONE ENCOUNTER
Medication:   Requested Prescriptions     Pending Prescriptions Disp Refills    metoprolol succinate (TOPROL XL) 25 MG extended release tablet [Pharmacy Med Name: METOPROLOL SUCC ER 25 MG TAB] 90 tablet 1     Sig: TAKE 1 TABLET BY MOUTH EVERY DAY        Last Filled:  02/12/2024, 90, 1    Patient Phone Number: [phone] (home)     Last appt: 8/28/2023   Next appt: Visit date not found    Last OARRS:        No data to display

## 2024-11-08 DIAGNOSIS — I10 ESSENTIAL HYPERTENSION: ICD-10-CM

## 2024-11-08 DIAGNOSIS — R00.0 TACHYCARDIA: ICD-10-CM

## 2024-11-08 RX ORDER — METOPROLOL SUCCINATE 25 MG/1
25 TABLET, EXTENDED RELEASE ORAL DAILY
Qty: 90 TABLET | Refills: 0 | Status: SHIPPED | OUTPATIENT
Start: 2024-11-08

## 2024-11-08 NOTE — TELEPHONE ENCOUNTER
Medication:   Requested Prescriptions     Pending Prescriptions Disp Refills    metoprolol succinate (TOPROL XL) 25 MG extended release tablet [Pharmacy Med Name: METOPROLOL SUCC ER 25 MG TAB] 90 tablet 0     Sig: TAKE 1 TABLET BY MOUTH EVERY DAY       Last Filled:  08/12/2024, 90, 0    Patient Phone Number: [phone] (home)     Last appt: 8/28/2023   Next appt: Visit date not found    Lab Results   Component Value Date     03/01/2023    K 3.5 03/01/2023     03/01/2023    CO2 24 03/01/2023    BUN 20 03/01/2023    CREATININE 0.8 (L) 03/01/2023    GLUCOSE 130 (H) 03/01/2023    CALCIUM 9.0 03/01/2023    BILITOT 1.0 03/01/2023    ALKPHOS 76 03/01/2023    AST 25 03/01/2023    ALT 28 03/01/2023    LABGLOM >60 03/01/2023    GFRAA >60 08/01/2018    AGRATIO 1.3 03/01/2023    GLOB 2.5 08/01/2018

## 2025-01-01 DIAGNOSIS — E87.6 HYPOKALEMIA: ICD-10-CM

## 2025-01-02 RX ORDER — POTASSIUM CHLORIDE 750 MG/1
TABLET, EXTENDED RELEASE ORAL
Qty: 30 TABLET | Refills: 0 | Status: SHIPPED | OUTPATIENT
Start: 2025-01-02

## 2025-01-02 NOTE — TELEPHONE ENCOUNTER
Medication:   Requested Prescriptions     Pending Prescriptions Disp Refills    KLOR-CON M10 10 MEQ extended release tablet [Pharmacy Med Name: KLOR-CON M10 TABLET] 90 tablet 1     Sig: TAKE 1 TABLET BY MOUTH EVERY DAY        Last Filled:  04/08/2024    Patient Phone Number: [phone] (home)     Last appt: 8/28/2023   Next appt: Visit date not found    Last OARRS:        No data to display

## 2025-01-15 DIAGNOSIS — E87.6 HYPOKALEMIA: ICD-10-CM

## 2025-01-15 RX ORDER — POTASSIUM CHLORIDE 750 MG/1
10 TABLET, EXTENDED RELEASE ORAL DAILY
Qty: 90 TABLET | Refills: 1 | OUTPATIENT
Start: 2025-01-15

## 2025-03-18 DIAGNOSIS — I10 ESSENTIAL HYPERTENSION: ICD-10-CM

## 2025-03-18 DIAGNOSIS — R00.0 TACHYCARDIA: ICD-10-CM

## 2025-03-18 RX ORDER — METOPROLOL SUCCINATE 25 MG/1
25 TABLET, EXTENDED RELEASE ORAL DAILY
Qty: 30 TABLET | Refills: 0 | Status: SHIPPED | OUTPATIENT
Start: 2025-03-18

## 2025-03-18 NOTE — TELEPHONE ENCOUNTER
Patient not seen in office recently. Will prescribe medication for 30 days and have patient schedule an appointment to be seen in office for Physical Exam and for Labwork.

## 2025-03-18 NOTE — TELEPHONE ENCOUNTER
Last OV: 8/28/2023  Next OV: Visit date not found    Next appointment due:    Last fill:11/08/2024  Refills:90/0  Medication:   Requested Prescriptions     Pending Prescriptions Disp Refills    metoprolol succinate (TOPROL XL) 25 MG extended release tablet 90 tablet 0     Sig: Take 1 tablet by mouth daily       Last Filled:      Patient Phone Number: [phone] (home)     Last appt: 8/28/2023   Next appt: Visit date not found    Lab Results   Component Value Date     03/01/2023    K 3.5 03/01/2023     03/01/2023    CO2 24 03/01/2023    BUN 20 03/01/2023    CREATININE 0.8 (L) 03/01/2023    GLUCOSE 130 (H) 03/01/2023    CALCIUM 9.0 03/01/2023    BILITOT 1.0 03/01/2023    ALKPHOS 76 03/01/2023    AST 25 03/01/2023    ALT 28 03/01/2023    LABGLOM >60 03/01/2023    GFRAA >60 08/01/2018    AGRATIO 1.3 03/01/2023    GLOB 2.5 08/01/2018